# Patient Record
Sex: MALE | Race: WHITE | URBAN - METROPOLITAN AREA
[De-identification: names, ages, dates, MRNs, and addresses within clinical notes are randomized per-mention and may not be internally consistent; named-entity substitution may affect disease eponyms.]

---

## 2017-07-11 ENCOUNTER — RECORDS - HEALTHEAST (OUTPATIENT)
Dept: LAB | Facility: CLINIC | Age: 82
End: 2017-07-11

## 2017-07-11 LAB — PSA SERPL-MCNC: 1.6 NG/ML (ref 0–6.5)

## 2017-08-08 ENCOUNTER — RECORDS - HEALTHEAST (OUTPATIENT)
Dept: LAB | Facility: CLINIC | Age: 82
End: 2017-08-08

## 2017-08-08 LAB
CHOLEST SERPL-MCNC: 146 MG/DL
FASTING STATUS PATIENT QL REPORTED: NO
HDLC SERPL-MCNC: 41 MG/DL
LDLC SERPL CALC-MCNC: 69 MG/DL
TRIGL SERPL-MCNC: 179 MG/DL

## 2018-08-21 ENCOUNTER — RECORDS - HEALTHEAST (OUTPATIENT)
Dept: LAB | Facility: CLINIC | Age: 83
End: 2018-08-21

## 2018-08-21 LAB
CHOLEST SERPL-MCNC: 136 MG/DL
FASTING STATUS PATIENT QL REPORTED: NO
HDLC SERPL-MCNC: 42 MG/DL
LDLC SERPL CALC-MCNC: 58 MG/DL
TRIGL SERPL-MCNC: 181 MG/DL

## 2018-10-02 ASSESSMENT — MIFFLIN-ST. JEOR
SCORE: 1513.18
SCORE: 1594.94

## 2018-10-04 ENCOUNTER — ANESTHESIA - HEALTHEAST (OUTPATIENT)
Dept: CARDIOLOGY | Facility: CLINIC | Age: 83
End: 2018-10-04

## 2018-10-04 ENCOUNTER — SURGERY - HEALTHEAST (OUTPATIENT)
Dept: CARDIOLOGY | Facility: CLINIC | Age: 83
End: 2018-10-04

## 2018-10-04 ASSESSMENT — MIFFLIN-ST. JEOR: SCORE: 1501.38

## 2018-10-06 ASSESSMENT — MIFFLIN-ST. JEOR: SCORE: 1487.78

## 2018-10-10 ENCOUNTER — RECORDS - HEALTHEAST (OUTPATIENT)
Dept: LAB | Facility: CLINIC | Age: 83
End: 2018-10-10

## 2018-10-10 LAB
TSH SERPL DL<=0.005 MIU/L-ACNC: 3.64 UIU/ML (ref 0.3–5)
VIT B12 SERPL-MCNC: 316 PG/ML (ref 213–816)

## 2018-10-11 ENCOUNTER — AMBULATORY - HEALTHEAST (OUTPATIENT)
Dept: CARDIOLOGY | Facility: CLINIC | Age: 83
End: 2018-10-11

## 2018-10-11 DIAGNOSIS — Z95.0 CARDIAC PACEMAKER IN SITU: ICD-10-CM

## 2018-10-11 LAB
HCC DEVICE COMMENTS: NORMAL
HCC DEVICE IMPLANTING PROVIDER: NORMAL
HCC DEVICE MANUFACTURE: NORMAL
HCC DEVICE MODEL: NORMAL
HCC DEVICE SERIAL NUMBER: NORMAL
HCC DEVICE TYPE: NORMAL

## 2018-10-11 ASSESSMENT — MIFFLIN-ST. JEOR: SCORE: 1503.65

## 2018-11-05 ENCOUNTER — AMBULATORY - HEALTHEAST (OUTPATIENT)
Dept: CARDIOLOGY | Facility: CLINIC | Age: 83
End: 2018-11-05

## 2018-11-05 DIAGNOSIS — Z95.0 CARDIAC PACEMAKER IN SITU: ICD-10-CM

## 2018-11-07 ENCOUNTER — RECORDS - HEALTHEAST (OUTPATIENT)
Dept: LAB | Facility: CLINIC | Age: 83
End: 2018-11-07

## 2018-11-07 LAB — INR PPP: 2.37 (ref 0.9–1.1)

## 2018-11-12 ENCOUNTER — RECORDS - HEALTHEAST (OUTPATIENT)
Dept: LAB | Facility: CLINIC | Age: 83
End: 2018-11-12

## 2018-11-13 LAB — INR PPP: 1.92 (ref 0.9–1.1)

## 2018-11-20 ENCOUNTER — RECORDS - HEALTHEAST (OUTPATIENT)
Dept: LAB | Facility: CLINIC | Age: 83
End: 2018-11-20

## 2018-11-20 LAB — INR PPP: 1.54 (ref 0.9–1.1)

## 2018-11-26 ENCOUNTER — RECORDS - HEALTHEAST (OUTPATIENT)
Dept: LAB | Facility: CLINIC | Age: 83
End: 2018-11-26

## 2018-11-27 LAB — INR PPP: 4.05 (ref 0.9–1.1)

## 2018-12-03 ENCOUNTER — RECORDS - HEALTHEAST (OUTPATIENT)
Dept: LAB | Facility: CLINIC | Age: 83
End: 2018-12-03

## 2018-12-04 LAB — INR PPP: 3.57 (ref 0.9–1.1)

## 2018-12-11 ENCOUNTER — RECORDS - HEALTHEAST (OUTPATIENT)
Dept: LAB | Facility: CLINIC | Age: 83
End: 2018-12-11

## 2018-12-11 LAB — INR PPP: 2.42 (ref 0.9–1.1)

## 2018-12-17 ENCOUNTER — RECORDS - HEALTHEAST (OUTPATIENT)
Dept: LAB | Facility: CLINIC | Age: 83
End: 2018-12-17

## 2018-12-18 LAB — INR PPP: 2.58 (ref 0.9–1.1)

## 2019-01-09 ENCOUNTER — AMBULATORY - HEALTHEAST (OUTPATIENT)
Dept: CARDIOLOGY | Facility: CLINIC | Age: 84
End: 2019-01-09

## 2019-01-09 DIAGNOSIS — Z95.0 CARDIAC PACEMAKER IN SITU: ICD-10-CM

## 2019-01-09 ASSESSMENT — MIFFLIN-ST. JEOR: SCORE: 1467.36

## 2019-02-01 ENCOUNTER — RECORDS - HEALTHEAST (OUTPATIENT)
Dept: LAB | Facility: CLINIC | Age: 84
End: 2019-02-01

## 2019-02-01 LAB — INR PPP: 3.82 (ref 0.9–1.1)

## 2019-04-01 ENCOUNTER — AMBULATORY - HEALTHEAST (OUTPATIENT)
Dept: CARDIOLOGY | Facility: CLINIC | Age: 84
End: 2019-04-01

## 2019-04-01 ENCOUNTER — COMMUNICATION - HEALTHEAST (OUTPATIENT)
Dept: CARDIOLOGY | Facility: CLINIC | Age: 84
End: 2019-04-01

## 2019-04-01 DIAGNOSIS — Z95.0 CARDIAC PACEMAKER IN SITU: ICD-10-CM

## 2019-05-25 ENCOUNTER — AMBULATORY - HEALTHEAST (OUTPATIENT)
Dept: CARDIOLOGY | Facility: CLINIC | Age: 84
End: 2019-05-25

## 2019-05-25 DIAGNOSIS — Z95.0 CARDIAC PACEMAKER IN SITU: ICD-10-CM

## 2019-05-28 ENCOUNTER — COMMUNICATION - HEALTHEAST (OUTPATIENT)
Dept: CARDIOLOGY | Facility: CLINIC | Age: 84
End: 2019-05-28

## 2019-05-29 ENCOUNTER — AMBULATORY - HEALTHEAST (OUTPATIENT)
Dept: CARDIOLOGY | Facility: CLINIC | Age: 84
End: 2019-05-29

## 2019-05-29 DIAGNOSIS — Z95.0 CARDIAC PACEMAKER IN SITU: ICD-10-CM

## 2019-05-29 LAB
HCC DEVICE COMMENTS: NORMAL
HCC DEVICE COMMENTS: NORMAL
HCC DEVICE IMPLANTING PROVIDER: NORMAL
HCC DEVICE IMPLANTING PROVIDER: NORMAL
HCC DEVICE MANUFACTURE: NORMAL
HCC DEVICE MANUFACTURE: NORMAL
HCC DEVICE MODEL: NORMAL
HCC DEVICE MODEL: NORMAL
HCC DEVICE SERIAL NUMBER: NORMAL
HCC DEVICE SERIAL NUMBER: NORMAL
HCC DEVICE TYPE: NORMAL
HCC DEVICE TYPE: NORMAL

## 2019-07-01 ENCOUNTER — HOME CARE/HOSPICE - HEALTHEAST (OUTPATIENT)
Dept: HOME HEALTH SERVICES | Facility: HOME HEALTH | Age: 84
End: 2019-07-01

## 2019-07-02 ENCOUNTER — AMBULATORY - HEALTHEAST (OUTPATIENT)
Dept: CARDIOLOGY | Facility: CLINIC | Age: 84
End: 2019-07-02

## 2019-07-02 DIAGNOSIS — Z95.0 CARDIAC PACEMAKER IN SITU: ICD-10-CM

## 2019-07-03 ENCOUNTER — COMMUNICATION - HEALTHEAST (OUTPATIENT)
Dept: CARDIOLOGY | Facility: CLINIC | Age: 84
End: 2019-07-03

## 2019-07-03 DIAGNOSIS — I48.0 PAROXYSMAL ATRIAL FIBRILLATION (H): ICD-10-CM

## 2019-07-07 ENCOUNTER — COMMUNICATION - HEALTHEAST (OUTPATIENT)
Dept: SCHEDULING | Facility: CLINIC | Age: 84
End: 2019-07-07

## 2019-07-27 ENCOUNTER — RECORDS - HEALTHEAST (OUTPATIENT)
Dept: LAB | Facility: CLINIC | Age: 84
End: 2019-07-27

## 2019-07-27 LAB
ANION GAP SERPL CALCULATED.3IONS-SCNC: 7 MMOL/L (ref 5–18)
BUN SERPL-MCNC: 11 MG/DL (ref 8–28)
CALCIUM SERPL-MCNC: 9.8 MG/DL (ref 8.5–10.5)
CHLORIDE BLD-SCNC: 103 MMOL/L (ref 98–107)
CO2 SERPL-SCNC: 30 MMOL/L (ref 22–31)
CREAT SERPL-MCNC: 0.7 MG/DL (ref 0.7–1.3)
GFR SERPL CREATININE-BSD FRML MDRD: >60 ML/MIN/1.73M2
GLUCOSE BLD-MCNC: 93 MG/DL (ref 70–125)
POTASSIUM BLD-SCNC: 3.6 MMOL/L (ref 3.5–5)
SODIUM SERPL-SCNC: 140 MMOL/L (ref 136–145)

## 2019-08-09 ENCOUNTER — RECORDS - HEALTHEAST (OUTPATIENT)
Dept: ADMINISTRATIVE | Facility: OTHER | Age: 84
End: 2019-08-09

## 2019-08-09 ENCOUNTER — AMBULATORY - HEALTHEAST (OUTPATIENT)
Dept: CARDIOLOGY | Facility: CLINIC | Age: 84
End: 2019-08-09

## 2019-08-12 ENCOUNTER — OFFICE VISIT - HEALTHEAST (OUTPATIENT)
Dept: CARDIOLOGY | Facility: CLINIC | Age: 84
End: 2019-08-12

## 2019-08-12 DIAGNOSIS — R79.89 ELEVATED TROPONIN LEVEL: ICD-10-CM

## 2019-08-12 DIAGNOSIS — Z95.0 CARDIAC PACEMAKER IN SITU: ICD-10-CM

## 2019-08-14 ENCOUNTER — RECORDS - HEALTHEAST (OUTPATIENT)
Dept: LAB | Facility: CLINIC | Age: 84
End: 2019-08-14

## 2019-08-15 ENCOUNTER — OFFICE VISIT - HEALTHEAST (OUTPATIENT)
Dept: NEUROSURGERY | Facility: CLINIC | Age: 84
End: 2019-08-15

## 2019-08-15 DIAGNOSIS — G91.2 NPH (NORMAL PRESSURE HYDROCEPHALUS) (H): ICD-10-CM

## 2019-08-15 LAB
IRON SATN MFR SERPL: 30 % (ref 20–50)
IRON SERPL-MCNC: 96 UG/DL (ref 42–175)
TIBC SERPL-MCNC: 320 UG/DL (ref 313–563)
TRANSFERRIN SERPL-MCNC: 256 MG/DL (ref 212–360)
VIT B12 SERPL-MCNC: 391 PG/ML (ref 213–816)

## 2019-08-15 ASSESSMENT — MIFFLIN-ST. JEOR: SCORE: 1471.9

## 2019-08-20 ENCOUNTER — RECORDS - HEALTHEAST (OUTPATIENT)
Dept: LAB | Facility: CLINIC | Age: 84
End: 2019-08-20

## 2019-08-20 LAB
ERYTHROCYTE [DISTWIDTH] IN BLOOD BY AUTOMATED COUNT: 14.3 % (ref 11–14.5)
HCT VFR BLD AUTO: 37.8 % (ref 40–54)
HGB BLD-MCNC: 12.4 G/DL (ref 14–18)
MCH RBC QN AUTO: 29.5 PG (ref 27–34)
MCHC RBC AUTO-ENTMCNC: 32.8 G/DL (ref 32–36)
MCV RBC AUTO: 90 FL (ref 80–100)
PLATELET # BLD AUTO: 401 THOU/UL (ref 140–440)
PMV BLD AUTO: 10.5 FL (ref 8.5–12.5)
RBC # BLD AUTO: 4.21 MILL/UL (ref 4.4–6.2)
WBC: 9.3 THOU/UL (ref 4–11)

## 2019-08-28 ENCOUNTER — COMMUNICATION - HEALTHEAST (OUTPATIENT)
Dept: NEUROSURGERY | Facility: CLINIC | Age: 84
End: 2019-08-28

## 2019-08-28 ENCOUNTER — HOSPITAL ENCOUNTER (OUTPATIENT)
Dept: PHYSICAL THERAPY | Facility: HOSPITAL | Age: 84
Discharge: HOME | End: 2019-08-28
Admitting: RADIOLOGY

## 2019-08-28 ENCOUNTER — HOSPITAL ENCOUNTER (OUTPATIENT)
Dept: RADIOLOGY | Facility: HOSPITAL | Age: 84
Discharge: HOME | End: 2019-08-28
Attending: SURGERY

## 2019-08-28 DIAGNOSIS — G91.2 NPH (NORMAL PRESSURE HYDROCEPHALUS) (H): ICD-10-CM

## 2019-08-28 DIAGNOSIS — G91.2 NORMAL PRESSURE HYDROCEPHALUS (H): ICD-10-CM

## 2019-08-28 LAB
APPEARANCE CSF: CLEAR
COLOR CSF: COLORLESS
GLUCOSE CSF-MCNC: 62 MG/DL (ref 40–75)
GRAM STAIN RESULT: NORMAL
PROT CSF-MCNC: 48 MG/DL (ref 15–45)
RBC # CSF MANUAL: 1 /UL
TUBE # CSF: 4
VOLUME CSF - HISTORICAL: 8 ML
WBC # CSF MANUAL: 1 /UL

## 2019-08-29 ENCOUNTER — HOSPITAL ENCOUNTER (OUTPATIENT)
Dept: PHYSICAL THERAPY | Facility: HOSPITAL | Age: 84
Discharge: HOME | End: 2019-08-29

## 2019-08-29 DIAGNOSIS — G91.2 NPH (NORMAL PRESSURE HYDROCEPHALUS) (H): ICD-10-CM

## 2019-08-30 ENCOUNTER — RECORDS - HEALTHEAST (OUTPATIENT)
Dept: LAB | Facility: CLINIC | Age: 84
End: 2019-08-30

## 2019-08-31 LAB — BACTERIA SPEC CULT: NO GROWTH

## 2019-09-03 LAB — HGB BLD-MCNC: 13.7 G/DL (ref 14–18)

## 2019-09-05 ENCOUNTER — RECORDS - HEALTHEAST (OUTPATIENT)
Dept: LAB | Facility: CLINIC | Age: 84
End: 2019-09-05

## 2019-09-05 LAB — HGB BLD-MCNC: 13.9 G/DL (ref 14–18)

## 2019-09-06 ENCOUNTER — RECORDS - HEALTHEAST (OUTPATIENT)
Dept: LAB | Facility: CLINIC | Age: 84
End: 2019-09-06

## 2019-09-06 LAB
ALBUMIN UR-MCNC: NEGATIVE MG/DL
APPEARANCE UR: CLEAR
BACTERIA #/AREA URNS HPF: ABNORMAL HPF
BILIRUB UR QL STRIP: NEGATIVE
COLOR UR AUTO: ABNORMAL
GLUCOSE UR STRIP-MCNC: NEGATIVE MG/DL
HGB UR QL STRIP: ABNORMAL
KETONES UR STRIP-MCNC: NEGATIVE MG/DL
LEUKOCYTE ESTERASE UR QL STRIP: ABNORMAL
MUCOUS THREADS #/AREA URNS LPF: ABNORMAL LPF
NITRATE UR QL: NEGATIVE
PH UR STRIP: 7.5 [PH] (ref 4.5–8)
RBC #/AREA URNS AUTO: ABNORMAL HPF
SP GR UR STRIP: 1.01 (ref 1–1.03)
SQUAMOUS #/AREA URNS AUTO: ABNORMAL LPF
UROBILINOGEN UR STRIP-ACNC: ABNORMAL
WBC #/AREA URNS AUTO: ABNORMAL HPF

## 2019-09-07 LAB — BACTERIA SPEC CULT: NO GROWTH

## 2019-09-10 ENCOUNTER — OFFICE VISIT - HEALTHEAST (OUTPATIENT)
Dept: NEUROSURGERY | Facility: CLINIC | Age: 84
End: 2019-09-10

## 2019-09-10 DIAGNOSIS — G91.2 NORMAL PRESSURE HYDROCEPHALUS (H): ICD-10-CM

## 2019-09-10 ASSESSMENT — MIFFLIN-ST. JEOR: SCORE: 1471.9

## 2019-10-06 ENCOUNTER — AMBULATORY - HEALTHEAST (OUTPATIENT)
Dept: CARDIOLOGY | Facility: CLINIC | Age: 84
End: 2019-10-06

## 2019-10-06 DIAGNOSIS — Z95.0 CARDIAC PACEMAKER IN SITU: ICD-10-CM

## 2019-10-07 ENCOUNTER — COMMUNICATION - HEALTHEAST (OUTPATIENT)
Dept: CARDIOLOGY | Facility: CLINIC | Age: 84
End: 2019-10-07

## 2019-11-12 ENCOUNTER — RECORDS - HEALTHEAST (OUTPATIENT)
Dept: LAB | Facility: CLINIC | Age: 84
End: 2019-11-12

## 2019-11-12 LAB
ALBUMIN UR-MCNC: ABNORMAL MG/DL
APPEARANCE UR: ABNORMAL
BACTERIA #/AREA URNS HPF: ABNORMAL HPF
BILIRUB UR QL STRIP: NEGATIVE
COLOR UR AUTO: YELLOW
GLUCOSE UR STRIP-MCNC: NEGATIVE MG/DL
HGB UR QL STRIP: ABNORMAL
HYALINE CASTS #/AREA URNS LPF: ABNORMAL LPF
KETONES UR STRIP-MCNC: NEGATIVE MG/DL
LEUKOCYTE ESTERASE UR QL STRIP: ABNORMAL
MUCOUS THREADS #/AREA URNS LPF: ABNORMAL LPF
NITRATE UR QL: NEGATIVE
PH UR STRIP: 8 [PH] (ref 4.5–8)
RBC #/AREA URNS AUTO: >100 HPF
SP GR UR STRIP: 1.01 (ref 1–1.03)
SQUAMOUS #/AREA URNS AUTO: ABNORMAL LPF
UROBILINOGEN UR STRIP-ACNC: ABNORMAL
WBC #/AREA URNS AUTO: >100 HPF
WBC CLUMPS #/AREA URNS HPF: PRESENT /[HPF]

## 2019-11-14 LAB
BACTERIA SPEC CULT: ABNORMAL
BACTERIA SPEC CULT: ABNORMAL

## 2020-01-01 ENCOUNTER — RECORDS - HEALTHEAST (OUTPATIENT)
Dept: LAB | Facility: CLINIC | Age: 85
End: 2020-01-01

## 2020-01-01 ENCOUNTER — COMMUNICATION - HEALTHEAST (OUTPATIENT)
Dept: CARE COORDINATION | Facility: CLINIC | Age: 85
End: 2020-01-01

## 2020-01-01 ENCOUNTER — COMMUNICATION - HEALTHEAST (OUTPATIENT)
Dept: NURSING | Facility: CLINIC | Age: 85
End: 2020-01-01

## 2020-01-01 ENCOUNTER — TRANSCRIBE ORDERS (OUTPATIENT)
Dept: OTHER | Age: 85
End: 2020-01-01

## 2020-01-01 ENCOUNTER — AMBULATORY - HEALTHEAST (OUTPATIENT)
Dept: CARDIOLOGY | Facility: CLINIC | Age: 85
End: 2020-01-01

## 2020-01-01 ENCOUNTER — COMMUNICATION - HEALTHEAST (OUTPATIENT)
Dept: CARDIOLOGY | Facility: CLINIC | Age: 85
End: 2020-01-01

## 2020-01-01 ENCOUNTER — RECORDS - HEALTHEAST (OUTPATIENT)
Dept: ADMINISTRATIVE | Facility: OTHER | Age: 85
End: 2020-01-01

## 2020-01-01 ENCOUNTER — AMBULATORY - HEALTHEAST (OUTPATIENT)
Dept: CARE COORDINATION | Facility: CLINIC | Age: 85
End: 2020-01-01

## 2020-01-01 ENCOUNTER — AMBULATORY - HEALTHEAST (OUTPATIENT)
Dept: OTHER | Facility: CLINIC | Age: 85
End: 2020-01-01

## 2020-01-01 ENCOUNTER — MEDICAL CORRESPONDENCE (OUTPATIENT)
Dept: HEALTH INFORMATION MANAGEMENT | Facility: CLINIC | Age: 85
End: 2020-01-01

## 2020-01-01 ENCOUNTER — OFFICE VISIT - HEALTHEAST (OUTPATIENT)
Dept: CARDIOLOGY | Facility: CLINIC | Age: 85
End: 2020-01-01

## 2020-01-01 DIAGNOSIS — Z95.0 CARDIAC PACEMAKER IN SITU: ICD-10-CM

## 2020-01-01 DIAGNOSIS — A41.9 SEPSIS (H): ICD-10-CM

## 2020-01-01 DIAGNOSIS — Z79.01 CHRONIC ANTICOAGULATION: ICD-10-CM

## 2020-01-01 DIAGNOSIS — I48.91 ATRIAL FIBRILLATION WITH RAPID VENTRICULAR RESPONSE (H): ICD-10-CM

## 2020-01-01 DIAGNOSIS — G20.A1 PARKINSON DISEASE (H): Primary | ICD-10-CM

## 2020-01-01 DIAGNOSIS — I45.10 RBBB: ICD-10-CM

## 2020-01-01 DIAGNOSIS — I48.0 PAROXYSMAL ATRIAL FIBRILLATION (H): ICD-10-CM

## 2020-01-01 DIAGNOSIS — I49.5 SINUS NODE DYSFUNCTION (H): ICD-10-CM

## 2020-01-01 DIAGNOSIS — R00.1 SYMPTOMATIC BRADYCARDIA: ICD-10-CM

## 2020-01-01 LAB
25(OH)D3 SERPL-MCNC: 36 NG/ML (ref 30–80)
ALBUMIN SERPL-MCNC: 3.1 G/DL (ref 3.5–5)
ALBUMIN UR-MCNC: ABNORMAL MG/DL
ALP SERPL-CCNC: 88 U/L (ref 45–120)
ALT SERPL W P-5'-P-CCNC: 14 U/L (ref 0–45)
ANION GAP SERPL CALCULATED.3IONS-SCNC: 6 MMOL/L (ref 5–18)
ANION GAP SERPL CALCULATED.3IONS-SCNC: 8 MMOL/L (ref 5–18)
ANION GAP SERPL CALCULATED.3IONS-SCNC: 9 MMOL/L (ref 5–18)
APPEARANCE UR: ABNORMAL
AST SERPL W P-5'-P-CCNC: 22 U/L (ref 0–40)
BACTERIA SPEC CULT: ABNORMAL
BACTERIA SPEC CULT: ABNORMAL
BASOPHILS # BLD AUTO: 0.1 THOU/UL (ref 0–0.2)
BASOPHILS # BLD AUTO: 0.1 THOU/UL (ref 0–0.2)
BASOPHILS NFR BLD AUTO: 1 % (ref 0–2)
BASOPHILS NFR BLD AUTO: 1 % (ref 0–2)
BILIRUB SERPL-MCNC: 1.1 MG/DL (ref 0–1)
BILIRUB UR QL STRIP: NEGATIVE
BUN SERPL-MCNC: 8 MG/DL (ref 8–28)
BUN SERPL-MCNC: 9 MG/DL (ref 8–28)
BUN SERPL-MCNC: 9 MG/DL (ref 8–28)
CALCIUM SERPL-MCNC: 8.1 MG/DL (ref 8.5–10.5)
CALCIUM SERPL-MCNC: 9.1 MG/DL (ref 8.5–10.5)
CALCIUM SERPL-MCNC: 9.3 MG/DL (ref 8.5–10.5)
CHLORIDE BLD-SCNC: 103 MMOL/L (ref 98–107)
CHLORIDE BLD-SCNC: 104 MMOL/L (ref 98–107)
CHLORIDE BLD-SCNC: 105 MMOL/L (ref 98–107)
CO2 SERPL-SCNC: 27 MMOL/L (ref 22–31)
CO2 SERPL-SCNC: 28 MMOL/L (ref 22–31)
CO2 SERPL-SCNC: 29 MMOL/L (ref 22–31)
COLOR UR AUTO: ABNORMAL
CREAT SERPL-MCNC: 0.59 MG/DL (ref 0.7–1.3)
CREAT SERPL-MCNC: 0.61 MG/DL (ref 0.7–1.3)
CREAT SERPL-MCNC: 0.82 MG/DL (ref 0.7–1.3)
DIGOXIN LEVEL LHE- HISTORICAL: 0.4 NG/ML (ref 0.5–2)
EOSINOPHIL # BLD AUTO: 0.1 THOU/UL (ref 0–0.4)
EOSINOPHIL # BLD AUTO: 0.2 THOU/UL (ref 0–0.4)
EOSINOPHIL NFR BLD AUTO: 1 % (ref 0–6)
EOSINOPHIL NFR BLD AUTO: 2 % (ref 0–6)
ERYTHROCYTE [DISTWIDTH] IN BLOOD BY AUTOMATED COUNT: 14 % (ref 11–14.5)
ERYTHROCYTE [DISTWIDTH] IN BLOOD BY AUTOMATED COUNT: 14.2 % (ref 11–14.5)
ERYTHROCYTE [DISTWIDTH] IN BLOOD BY AUTOMATED COUNT: 15.1 % (ref 11–14.5)
ERYTHROCYTE [DISTWIDTH] IN BLOOD BY AUTOMATED COUNT: 15.3 % (ref 11–14.5)
GFR SERPL CREATININE-BSD FRML MDRD: >60 ML/MIN/1.73M2
GLUCOSE BLD-MCNC: 107 MG/DL (ref 70–125)
GLUCOSE BLD-MCNC: 79 MG/DL (ref 70–125)
GLUCOSE BLD-MCNC: 79 MG/DL (ref 70–125)
GLUCOSE UR STRIP-MCNC: NEGATIVE MG/DL
HCC DEVICE COMMENTS: NORMAL
HCC DEVICE IMPLANTING PROVIDER: NORMAL
HCC DEVICE MANUFACTURE: NORMAL
HCC DEVICE MODEL: NORMAL
HCC DEVICE SERIAL NUMBER: NORMAL
HCC DEVICE TYPE: NORMAL
HCT VFR BLD AUTO: 37.6 % (ref 40–54)
HCT VFR BLD AUTO: 39 % (ref 40–54)
HCT VFR BLD AUTO: 39.5 % (ref 40–54)
HCT VFR BLD AUTO: 41.4 % (ref 40–54)
HGB BLD-MCNC: 12.4 G/DL (ref 14–18)
HGB BLD-MCNC: 12.6 G/DL (ref 14–18)
HGB BLD-MCNC: 12.9 G/DL (ref 14–18)
HGB BLD-MCNC: 13.4 G/DL (ref 14–18)
HGB UR QL STRIP: ABNORMAL
IMM GRANULOCYTES # BLD: 0 THOU/UL
IMM GRANULOCYTES # BLD: 0.1 THOU/UL
IMM GRANULOCYTES NFR BLD: 0 %
IMM GRANULOCYTES NFR BLD: 1 %
KETONES UR STRIP-MCNC: NEGATIVE MG/DL
LEUKOCYTE ESTERASE UR QL STRIP: ABNORMAL
LEVETIRACETAM (KEPPRA): 22.8 UG/ML (ref 6–46)
LYMPHOCYTES # BLD AUTO: 1.3 THOU/UL (ref 0.8–4.4)
LYMPHOCYTES # BLD AUTO: 1.6 THOU/UL (ref 0.8–4.4)
LYMPHOCYTES NFR BLD AUTO: 13 % (ref 20–40)
LYMPHOCYTES NFR BLD AUTO: 18 % (ref 20–40)
MCH RBC QN AUTO: 29.2 PG (ref 27–34)
MCH RBC QN AUTO: 29.3 PG (ref 27–34)
MCH RBC QN AUTO: 29.8 PG (ref 27–34)
MCH RBC QN AUTO: 29.9 PG (ref 27–34)
MCHC RBC AUTO-ENTMCNC: 31.9 G/DL (ref 32–36)
MCHC RBC AUTO-ENTMCNC: 32.4 G/DL (ref 32–36)
MCHC RBC AUTO-ENTMCNC: 33 G/DL (ref 32–36)
MCHC RBC AUTO-ENTMCNC: 33.1 G/DL (ref 32–36)
MCV RBC AUTO: 90 FL (ref 80–100)
MCV RBC AUTO: 91 FL (ref 80–100)
MCV RBC AUTO: 91 FL (ref 80–100)
MCV RBC AUTO: 92 FL (ref 80–100)
MONOCYTES # BLD AUTO: 0.6 THOU/UL (ref 0–0.9)
MONOCYTES # BLD AUTO: 1.4 THOU/UL (ref 0–0.9)
MONOCYTES NFR BLD AUTO: 11 % (ref 2–10)
MONOCYTES NFR BLD AUTO: 9 % (ref 2–10)
NEUTROPHILS # BLD AUTO: 5.1 THOU/UL (ref 2–7.7)
NEUTROPHILS # BLD AUTO: 9.2 THOU/UL (ref 2–7.7)
NEUTROPHILS NFR BLD AUTO: 71 % (ref 50–70)
NEUTROPHILS NFR BLD AUTO: 74 % (ref 50–70)
NITRATE UR QL: POSITIVE
PH UR STRIP: 6 [PH] (ref 4.5–8)
PLATELET # BLD AUTO: 413 THOU/UL (ref 140–440)
PLATELET # BLD AUTO: 451 THOU/UL (ref 140–440)
PLATELET # BLD AUTO: 466 THOU/UL (ref 140–440)
PLATELET # BLD AUTO: 494 THOU/UL (ref 140–440)
PMV BLD AUTO: 10.8 FL (ref 8.5–12.5)
PMV BLD AUTO: 11.1 FL (ref 8.5–12.5)
PMV BLD AUTO: 11.2 FL (ref 8.5–12.5)
PMV BLD AUTO: 11.3 FL (ref 8.5–12.5)
POTASSIUM BLD-SCNC: 3.5 MMOL/L (ref 3.5–5)
POTASSIUM BLD-SCNC: 3.7 MMOL/L (ref 3.5–5)
POTASSIUM BLD-SCNC: 4.2 MMOL/L (ref 3.5–5)
PROT SERPL-MCNC: 6.8 G/DL (ref 6–8)
RBC # BLD AUTO: 4.15 MILL/UL (ref 4.4–6.2)
RBC # BLD AUTO: 4.31 MILL/UL (ref 4.4–6.2)
RBC # BLD AUTO: 4.33 MILL/UL (ref 4.4–6.2)
RBC # BLD AUTO: 4.57 MILL/UL (ref 4.4–6.2)
SODIUM SERPL-SCNC: 139 MMOL/L (ref 136–145)
SODIUM SERPL-SCNC: 140 MMOL/L (ref 136–145)
SODIUM SERPL-SCNC: 140 MMOL/L (ref 136–145)
SP GR UR STRIP: 1.01 (ref 1–1.03)
TSH SERPL DL<=0.005 MIU/L-ACNC: 1.56 UIU/ML (ref 0.3–5)
TSH SERPL DL<=0.005 MIU/L-ACNC: 2.12 UIU/ML (ref 0.3–5)
UROBILINOGEN UR STRIP-ACNC: ABNORMAL
VIT B12 SERPL-MCNC: 365 PG/ML (ref 213–816)
VIT B12 SERPL-MCNC: 576 PG/ML (ref 213–816)
WBC: 12.4 THOU/UL (ref 4–11)
WBC: 7.2 THOU/UL (ref 4–11)
WBC: 8.6 THOU/UL (ref 4–11)
WBC: 8.6 THOU/UL (ref 4–11)

## 2020-01-01 ASSESSMENT — ACTIVITIES OF DAILY LIVING (ADL)
DEPENDENT_IADLS:: CLEANING;COOKING;LAUNDRY;SHOPPING;MEAL PREPARATION;MEDICATION MANAGEMENT;MONEY MANAGEMENT;TRANSPORTATION;INCONTINENCE

## 2020-01-06 ENCOUNTER — RECORDS - HEALTHEAST (OUTPATIENT)
Dept: ADMINISTRATIVE | Facility: OTHER | Age: 85
End: 2020-01-06

## 2020-01-07 ENCOUNTER — AMBULATORY - HEALTHEAST (OUTPATIENT)
Dept: CARDIOLOGY | Facility: CLINIC | Age: 85
End: 2020-01-07

## 2020-01-07 DIAGNOSIS — R00.1 SYMPTOMATIC BRADYCARDIA: ICD-10-CM

## 2020-01-07 DIAGNOSIS — Z95.0 CARDIAC PACEMAKER IN SITU: ICD-10-CM

## 2020-01-07 ASSESSMENT — MIFFLIN-ST. JEOR: SCORE: 1421.54

## 2021-01-01 ENCOUNTER — PRE VISIT (OUTPATIENT)
Dept: NEUROLOGY | Facility: CLINIC | Age: 86
End: 2021-01-01

## 2021-05-26 ENCOUNTER — RECORDS - HEALTHEAST (OUTPATIENT)
Dept: ADMINISTRATIVE | Facility: CLINIC | Age: 86
End: 2021-05-26

## 2021-05-29 NOTE — TELEPHONE ENCOUNTER
Type: Add-on remote pacemaker transmission per Yuliana POWERS to assess AF.   Presenting Rhythm: AP-VS, rate 93 bpm.   Lead/Battery status: Stable.   Arrhythmias: Since 5/25/19, AF episode from 5/25/19 lasted ~ 28 hours per trend, burden 5.8%, ventricular rates >/=120bpm ~30-35%, no new EGM available for review.  No ventricular arrhythmias.   Anticoagulant: Warfarin.   Comments: Normal device function. Routed to device RN for review. KLP      Episode lasted about 28 hours. Overall burden remains low. Patient was asymptomatic and ventricular rates during AF do not meet routing criteria. Continue to monitor routinely for increased burden and ventricular response. Dr. Rangel was made aware recently and advised he stay on warfarin indefinitely. No changes at this time.    Yuliana Antoine, RN BSN  [facility]

## 2021-05-29 NOTE — TELEPHONE ENCOUNTER
LVM to discuss symptoms. Plan to have patient send another remote to assess current AF. Rates are rapid during AF.         Remote Report:  Type: pacemaker alert remote transmission for AT/AF >/=24hrs.  Presenting rhythm: atrial fibrillation with ventricular sensing 80-130bpm.  Battery/lead status: stable.  Arrhythmias: since 4/1/19; 11 AF episodes, current episode in progress since 5/24/19, 20hrs noted on 4/29/19, v-rates >/=120bpm 85%, AF burden 3.4%. One ventricular high rate episode and 23 fast A&V episodes, available EGMs show AF with RVR.  Anticoagulant: warfarin.  Comments: normal pacemaker function. Routed to device RN for review. CARINA ADD: Alert received for AF on 5/25/19, episode 24hrs.

## 2021-05-29 NOTE — TELEPHONE ENCOUNTER
Spoke to patient today and asked him to send an additional remote download. He agreed. He said he was unaware of AF occurring last week. He confirmed he is taking warfarin. Will assess results once processed.    Yuliana Antoine RN BSN  [facility]

## 2021-05-30 ENCOUNTER — RECORDS - HEALTHEAST (OUTPATIENT)
Dept: ADMINISTRATIVE | Facility: CLINIC | Age: 86
End: 2021-05-30

## 2021-05-30 NOTE — TELEPHONE ENCOUNTER
RN triage  Patient is calling to report that he does not know how to take his warfarin.  Patient was discharged from [facility] 7/1/19 for bleeding/bruising. He was instructed to follow up with PCP and have INR checked.  Patient did follow up with PCP  with [facility] since discharge.  Patient cannot remember his dosing instructions after that visit.  Patient was encouraged to call his [facility] clinic and request to speak to the on call provider.  Gave patient his primary clinic phone number that is listed in our chart.  Patient stated understanding and will call his clinic.  Rody Dewey, RN  [facility] Triage Nurse  4:31 PM  7/7/2019    Reason for Disposition    Caller has URGENT medication question about med that PCP prescribed and triager unable to answer question    Protocols used: MEDICATION QUESTION CALL-A-

## 2021-05-30 NOTE — TELEPHONE ENCOUNTER
Pt calling that he was discharged from [facility] with a zeng catheter and an external bag and him and his wife have questions on how to put his pants on with the zeng - wife is helping him while Triage RN on the phone with pt.  Nothing further needed.    Marily Palacios RN, MA  [facility] RN Triage Nurse Advisor

## 2021-05-30 NOTE — TELEPHONE ENCOUNTER
Attempted to contact pt, voicemail message was left with my contact information instructing pt to call back  7/10/2019 9:00 AM  Lucina Alexandra RN

## 2021-05-30 NOTE — TELEPHONE ENCOUNTER
Pt was called, corresponding information/recommendations reviewed, verbalized understanding, has no further questions at this time, contact information was given for further concerns/questions, scheduling notified to contact pt, RX was sent to pt pharmacy and medication list updated   7/16/2019 11:19 AM  Lucina Alexandra RN

## 2021-05-30 NOTE — TELEPHONE ENCOUNTER
Type: routine pacemaker remote transmission.   Presenting rhythm: sinus 60 bpm.  Battery/lead status: stable.  Arrhythmias: since 5/29/19; 3 mode switch episodes and 17 fast A&V episodes, available EGMs show AF with RVR, longest lasting 30hrs on 6/11/19, v-rates >/=120bpm ~95%, AF burden 3.6%. One ventricular high rate episode, appears to be PAT.   Anticoagulant: warfarin.  Comments: normal pacemaker function. Routed to device RN for review. CARINA      Transmission reviewed, known PAF with some tendency toward RVR. Previously reviewed by Dr. Rangel and are watching AF burdens, which is now ranging 3-5% on remote. Last 2 AF episodes were 28 and 30 hours long with average V-rates of 130s, Max 170s. V-rate Histograms showing increase in V-rate trends (see below). Historically has been asymptomatic. Call placed to patient today, [facility] for call back to assess.      Will review with Dr. Claire Jones,       Plan   Start toprol 50 mg/day  (in future might add Flecainide; concerned about CNS status=-might be worsened by Flecainide)  Follow with primary cardiologist; Dr Metzger

## 2021-05-31 ENCOUNTER — RECORDS - HEALTHEAST (OUTPATIENT)
Dept: ADMINISTRATIVE | Facility: CLINIC | Age: 86
End: 2021-05-31

## 2021-05-31 NOTE — PROGRESS NOTES
Neurosurgery consultation was requested by: Dr. Waite for NPH.  Patient was presented to [facility] ED on 7/21/2019 with gait instability and generalized weakness.  Today he presents with a chief complaint of occasional positional HA and generalized weakness. Has [place] in. On Elequis. Denies memory issues. Speech is fluent. Cognition is intact.  Gail Hirsch RN, CNRN

## 2021-05-31 NOTE — PATIENT INSTRUCTIONS - HE
Sumeet Grey,    It was a pleasure to see you today at the [facility].     My recommendations after this visit include:    Continue current cardiac medications    AMANDA Metzger MD, FACC, LISA

## 2021-05-31 NOTE — TELEPHONE ENCOUNTER
Left another message for pt to cb. Called Nacho Hernandez. After being transferred multiple time I was sent to [employer]. Left a detailed message about who I was calling for and why as well as left my name and phone number.

## 2021-05-31 NOTE — PROGRESS NOTES
Cardiology Progress Note    Assessment:  Sinus node dysfunction, status post permanent pacemaker, normal device function  Paroxysmal atrial fibrillation, asymptomatic, detected by pacemaker interrogation, on chronic anticoagulation with Eliquis  Borderline troponin elevation in setting of sepsis  Possible normal pressure hydrocephalus, pending neurosurgical evaluation    Plan:  Continue current cardiac medications  I did not believe that ischemic evaluation is indicated at this point as the patient has marginal functional capacity and no obvious symptoms of ischemia/angina.    Follow-up in 1 year    Subjective:   This is 84 y.o. male who comes in today for follow-up visit.  I saw him last year in October when he had syncopal episode.  He underwent pacemaker interrogation.  Subsequent pacemaker interrogation revealed short episode of atrial fibrillation.  He has been taking warfarin and now he switched to Eliquis.   He returned to the hospital in July of this year with mental status changes.  He was found to have urinary tract infection.  He had borderline troponin elevation of 0.5.  He did not have any chest symptoms.  He was seen by 1 of my colleagues who advised against any ischemic evaluation.  He did have echocardiogram that showed normal LV systolic function without segmental wall motion abnormalities.  He was evaluated by neurologist who entertained the diagnosis of normal pressure hydrocephalus.  The patient is awaiting neurosurgical evaluation.  Today he reinforced absence of chest pains or increasing shortness of breath.  He is unaware of heart palpitations.    Review of Systems:   General: WNL  Eyes: WNL  Ears/Nose/Throat: WNL  Lungs: WNL  Heart: WNL  Stomach: WNL  Bladder: WNL  Muscle/Joints: Muscle Weakness  Skin: WNL  Nervous System: Falls  Mental Health: WNL     Blood: WNL    Objective:   /62 (Patient Site: Left Arm, Patient Position: Sitting, Cuff Size: Adult Regular)   Pulse 64   Resp 16    Wt 171 lb (77.6 kg)   BMI 24.54 kg/m    Physical Exam:  GENERAL: no distress  NECK: No JVD  LUNGS: Clear to auscultation.  CARDIAC: regular rhythm, S1 & S2 normal.  No heaves, thrills, gallops or murmurs.  ABDOMEN: flat, negative hepatosplenomegaly, soft and non-tender.  EXTREMITIES: No evidence of cyanosis, clubbing or edema.    Current Outpatient Medications   Medication Sig Dispense Refill     acetaminophen (TYLENOL) 500 MG tablet Take 1-2 tablets (500-1,000 mg total) by mouth every 4 (four) hours as needed.  0     apixaban (ELIQUIS) 5 mg Tab tablet Take 1 tablet (5 mg total) by mouth 2 (two) times a day. 60 tablet 0     calcium carbonate (OS-PADMINI) 600 mg calcium (1,500 mg) tablet Take 600 mg by mouth 2 (two) times a day. Take 2 tablets every morning and 1 tablet every night              cetirizine (ZYRTEC) 10 MG tablet Take 10 mg by mouth daily as needed for allergies.              furosemide (LASIX) 20 MG tablet Take 1 tablet (20 mg total) by mouth daily as needed. Take lasix 20 mg once a day if needed for leg swelling/edema 30 tablet 0     magnesium hydroxide (MILK OF MAG) 400 mg/5 mL Susp suspension Take 30 mL by mouth daily as needed.  0     metoprolol succinate (TOPROL XL) 50 MG 24 hr tablet Take 1 tablet (50 mg total) by mouth daily. 90 tablet 3     polyethylene glycol (MIRALAX) 17 gram packet Take 1 packet (17 g total) by mouth daily.  0     simvastatin (ZOCOR) 40 MG tablet Take 40 mg by mouth at bedtime.       vitamin A-vitamin C-vit E-min (OCUVITE) Tab tablet Take 1 tablet by mouth 2 (two) times a day.              No current facility-administered medications for this visit.        Cardiographics:      Echocardiogram: July 2019    When compared to the previous study dated 10/3/2018, no significant change.    Normal left ventricular size, borderline LVH, and normal wall motion. Left ventricle ejection fraction is normal. The calculated left ventricular ejection fraction is 58%.    Normal right  ventricular size and systolic function.    No obvious valvular disease.        Lab Results:       Lab Results   Component Value Date    CHOL 150 10/03/2018    CHOL 136 08/21/2018    CHOL 146 08/08/2017     Lab Results   Component Value Date    HDL 44 10/03/2018    HDL 42 08/21/2018    HDL 41 08/08/2017     Lab Results   Component Value Date    LDLCALC 85 10/03/2018    LDLCALC 58 08/21/2018    LDLCALC 69 08/08/2017     Lab Results   Component Value Date    TRIG 103 10/03/2018    TRIG 181 (H) 08/21/2018    TRIG 179 (H) 08/08/2017     BNP   Date Value Ref Range Status   06/28/2019 58 0 - 91 pg/mL Final       Eliseo (Ramos)  MD Domenica

## 2021-05-31 NOTE — PROCEDURES
Interventional Neuroradiology    Procedure:  Lumbar puncture    Radiologist:  Evelio Yu    Fluoro Time:  0.5 minutes    Number of Images:  1    Complications:  none    Specimens:  30 ml clear CSF    EBL: Minimal      Preliminary Findings (See dictation for full detail)  LP via L3-4 puncture. Opening pressure = 15 cm h20  High volume tap, 30 ml removed    Assess/Plan:  Bedrest for 2 hours or per PT for get up and go protocol  See lab/PT report    Evelio Yu

## 2021-05-31 NOTE — PROGRESS NOTES
Physical Therapy Outpatient NPH Assessment/Treatment  Sumeet Grey   8/28/2019   Diagnoses: Possible Normal Pressure Hydrocephalus (NPH), imbalance  Patient Active Problem List   Diagnosis     Chronic anticoagulation     Mixed hyperlipidemia     RBBB     Cardiac pacemaker, dual, in situ     Elevated troponin level; Type III event     Weakness     Paroxysmal atrial fibrillation (H)     Urinary tract infection associated with indwelling urethral catheter, initial encounter (H)     Anemia     Normal pressure hydrocephalus     Past Medical History:   Diagnosis Date     Anemia 10/06/2018     Cancer (H)      Elevated troponin level; Type III event 7/20/2019     Stroke (cerebrum) (H)      Syncope        Subjective-  Subjective Information/Comments: Pt has no pain and no HA.  Pt said he has not any change with walking.  Pt said he feels like a drunk  when walking.  Pt's wife said he walks slowly and does shuffle.      Has patient fallen in the last 6 months? Yes    Pt lives in [facility] and he ambulates with the 4WW.  He does not have any steps there.     Does patient receive assistance with the following at baseline:  Supine<>sit Yes Pt has the HOB elevated and does get some assist at times.     Sit<>stand No but does use the 4WW   Gait No but uses the 4WW   Stairs No      Objective-    Pain: No    Left LE Assessment- 5/5  Right LE Assessment- 5/5  Light touch intact bilat LEs  Transfer Status:   Sit>stand Supervision / Standby Assist   Stand>sit Supervision / Standby Assist    Comments: Pt uses the 4WW, He does not place the walker in front of the chair well before sitting down.  He does not get himself in front of the chair well before sitting down. .He does use his hands on the arm rests.  He does stand > slowly.     Gait Speed:   Gait speed was .68 meters per second, indicating decreased speed and patient is at risk for falls. Pt did use his 4WW for the test.   Gait Speed Interpretation   >1.2 m/sec  Independent in all activities and crossing street   0.8-1.0 m/sec Community ambulator, household activities. May need intervention to reduce falls risk.   0.6-0.8 m/sec Performs self-care, limited community ambulator. May need intervention to reduce falls risk.   <0.6 m/sec Dependent in ADLs, household ambulator. Needs intervention to reduce falls risk.    Minimal Detectable Change for preferred gait (PD) = 0.18 meters/sec      Timed Up & Go (TUG) Balance Assessment: Average 19.03 sec   Trial 1 18.56 sec; Trial 2 8.59 sec; Trial 3 9.04 sec       Gait assistive device used: He did use the 4WW    Time  Interpretation   <10 seconds Freely mobile   >13.5 seconds Indicates increased risk for falling in community dwelling older adults   >20 seconds Indicates impaired functional mobility; may need an assistive device   >30 seconds Indicates dependency in most ADL and mobility skills   The TUG is a test of basic mobility skills.  It is used to screen individuals prone to falls.    Minimal Detectable Change for patients with Alzheimzer s = 4.09 sec    Minimal Detectable Change for patients with Parkinson s Disease = 3.5 sec      Five Times Sit to Stand Test: (FTSST) The FTSTST measures functional LE strength and provides information about postural control during transitions to/from standing.    Patient Score: 13.40 seconds  Did patient require use of hands to complete? No (If yes, the test is not valid; however, still gives objective measurement of function)    Interpretation of Results: > 16.0 seconds indicates risk of falls.    Average score (diagnosis of PD) =9.67 seconds (range = 5.9 - 13.5 sec)    Minimal Detectable Change = 2.5 sec    Minimally Clinical Important Difference = 2.3 sec    Additional Gait Training/Comments: Pt walked 20'x 3, 100' x3 and 250' with the 4WW with supervision.  Pt has flexed posture and does take smaller shuffling steps. His feet do not pass each other when he walked longer walks with  decreased foot clearance with swing phase. During the speed test, his steps were larger and each foot did pass each other.  He does increase his shuffling with turning and going through the door.  He does not initiate gait quickly.  Pt did walk 20' x1 without AD and his steps are much smaller and they do not pass each other.  He does walk much slower without AD.  Decreased bilat arm swing and he does have a flexed posture.     Pt did ambulate 4 steps with bilat rails with SBA, Pt moves slowly and had no LOB.  Good bilat steps up and down the steps.         Education: PT did take increased time to educate the pt and family member on the POC. Increased time needed for this.           Assessment-  Pt is a 84 y.o. y.o. male referred to Physical Therapy for evaluation and treatment of Normal Pressure Hydrocephalus (NPH). Assessment findings will be compared to post-lumbar puncture data in order to assist with decision making regarding shunt placement. Assessment findings indicate that patient is at medium risk of falls.        Physical Therapy Diagnosis: Gait instability  Impairments: Decreased balance,  Functional Limitations: Decreased functional mobility, gait, and balance resulting in increased risk for falls    Plan-  Treatment Plan will include: Therapeutic activity/Transfer training, Patient/caregiver education, Gait training, Neuromuscular re-education    Frequency/Duration: 2x/week, for 1 week; up to 2 visits    Goals:  -Patient will ambulate 300 with least restrictive assistive device independently, in order to ambulate household distance.   - Patient will demonstrate meaningful improvement in standardized tests in order to improve community mobility and reduce falls risk.   -Patient will demonstrate understanding of education regarding NPH and applicable exercises to improve balance and falls risk.       Treatment Time/Interventions:    Gait training 15 minutes   Neuro Re-ed 10 minutes       Preethi Romano, PT  8/28/2019     Physician Recommendation:  1. I certify the need for these services furnished within this plan and while under my care. I agree with the therapist's recommendation for plan of care.  2. If there is any recommendation for modification of therapy plan, please indicate below.    Physician's Signature (Printed):  _____________________________

## 2021-05-31 NOTE — PROGRESS NOTES
"Physical Therapy    Physical Therapy NPH Follow-Up     Sumeet Grey   8/29/2019      Patient returns for follow-up appointment after lumbar puncture to asses change in balance, gait, and functional mobility outcomes after CSF drainage, in order to assist in determining if possible shunt placement is indicated in treatment of NPH.     Subjective-    Patient Comments: LP \"went fine.\" Reports no change in gait now compared to pre-LP    Objective-    Pain: None    Gait Speed:   Gait speed was .48 meters per second, indicating decreased speed and patient is at risk for falls. (compared to .68 m/s pre-LP)  Gait Speed Interpretation   >1.2 m/sec Independent in all activities and crossing street   0.8-1.0 m/sec Community ambulator, household activities. May need intervention to reduce falls risk.   0.6-0.8 m/sec Performs self care, limited community ambulator. May need intervention to reduce falls risk.   <0.6 m/sec Dependent in ADLs, household ambulator. Needs intervention to reduce falls risk.    Minimal Detectable Change for preferred gait (PD) = 0.18 meters/sec      Timed Up & Go (TUG) Balance Assessment: Average 24.47 sec (compared to 19.03 pre-LP)   Trial 1 20.97 sec; Trial 2 25.97 sec; Trial 3 26.47 sec   Gait assistive device used: 4WW    Time  Interpretation   <10 seconds Freely mobile   >13.5 seconds Indicates increased risk for falling in community dwelling older adults   >20 seconds Indicates impaired functional mobility; may need an assistive device   >30 seconds Indicates dependency in most ADL and mobility skills   The TUG is a test of basic mobility skills.  It is used to screen individuals prone to falls.    Minimal Detectable Change for patients with Alzheimzer s = 4.09 sec    Minimal Detectable Change for patients with Parkinson s Disease = 3.5 sec      Five Times Sit to Stand Test: (FTSST) The FTSTST measures functional LE strength and provides information about postural control during transitions " to/from standing.    Patient Score: 16.94 seconds (compared to 13.40 sec pre-LP)  Did patient require use of hands to complete? No (If yes, the test is not valid; however, still gives objective measurement of function)    Interpretation of Results: > 16.0 seconds indicates risk of falls.    Average score (diagnosis of PD) =9.67 seconds (range = 5.9 - 13.5 sec)    Minimal Detectable Change = 2.5 sec    Minimally Clinical Important Difference = 2.3 sec    Additional Gait Training/Comments: Pt walked over 1,000' throughout session (with seated rests as needed) using the 4WW with supervision.  Pt has flexed posture and does take smaller, shuffling steps. He demonstrates decreased foot clearance with swing phase. This temporarily improves with cues for larger, taller steps, but improvements do not last long. During the speed test, his steps were larger and each foot did pass each other.  Pt collided with obstacles on the left 4 times and on the right once with 4WW during gait training. Pt/spouse education on safety with 4WW: Keeping hips close to walker, walker centered in relation to body to prevent tripping over wheels, posture, brakes, and high gaze.      Education: Increased time needed to educate pt and spouse on results and implications of testing, NPH in general, and next steps in the process. They were unaware of a follow-up appointment with Dr. Quiroga (and in fact, did not even remember who Dr. Quiroga IS), so they were provided with the date/time and the phone number of [facility] to confirm.       Assessment-  Patient demonstrates increased time to complete TUG, decreased Gait Speed, and increased time in FTSST performance. Remains at high risk of falls. Overall showed NO improvement in any test scores.     Plan-  Will discharge patient from Physical Therapy. Results will be communicated with referring physician.     Goals Met? Partially. Please note that if goals are partially met this is related  to performance level and short duration of PT intervention during current encounter for testing purposes. This may indicate the need for future PT intervention to reduce falls risk and improve independent mobility. Pt is currently residing     Treatment Time/Interventions:    Gait training 35 minutes   Neuro Re-ed 25 minutes       Yen Cazares, PT  8/29/2019

## 2021-05-31 NOTE — TELEPHONE ENCOUNTER
LMTCB on patient's phone and also w Nacho Hernandez. Patient needs f/u post lumbar puncture w Dr. Quiroga on 9/5/19 (Sandrita) at 3:30 (per Ashlie).

## 2021-05-31 NOTE — PROGRESS NOTES
NEUROSURGERY CONSULTATION NOTE    8/15/2019     Sumeet Grey is a 84 y.o. male who is sent to us in consultation by Troy Ricardo for evaluation of normal pressure hydrocephalus.         CONSULTATION ASSESSMENT AND PLAN:    85 yo male who presents with generalized weakness, imbalance, urinary incontinence (zeng in place) and confusion.  Recent CT was stable to MRI from 2018 but progressed from 2008 showing ventriculomegaly which is partially due to volume loss from old infarcts. Unclear if NPH. Offered LP in the past but did not want to proceed at that time per notes. Discussed again LP and get up and go test. Patient willing to proceed. Order placed. Return to clinic after testing.     I spent more than 45 minutes in this apt, examining the pt, reviewing the scans, reviewing notes from chart, discussing treatment options with risks and benefits and coordinating care. >50 % clinic time was spent in face to face counseling and coordinating care    Halley Quiroga        HPI:  85 yo male who presents with generalized weakness, imbalance, urinary incontinence (zeng in place) and confusion. This has been going on for several months to years. He uses a walker an/or wheelchair. He also had a zeng placed at his nursing home for his inconvenience long-standing. He is confused at times for several months. Recently admitted and evaluated by neurology. CT was stable showing ventriculomegaly but without change from previous. Evaluated for NPH in the past and he had refused LP at that time.     No nausea, emesis, blurred vision, seizures, headaches, numbness, new weakness.       Past Medical History:   Diagnosis Date     Anemia 10/06/2018     Cancer (H)      Elevated troponin level; Type III event 7/20/2019     Stroke (cerebrum) (H)      Syncope      Past Surgical History:   Procedure Laterality Date     EP PACEMAKER INSERT Left 10/4/2018    EP Pacemaker Insertion; Felipe Rangel MD;  Location: [facility]  Cath Lab     LUMBAR PUNCTURE FLUORO GUIDED DIAGNOSTIC  7/23/2019         REVIEW OF SYSTEMS:  ROS reviewed with pt as documented on pt health form of 8/15/2019.    Negative cardiac, pulmonary, hematological with exception of neurological as per HPi   .  No family hx of anesthetic reactions  .  No family hx of hypercoagulability .       MEDICATIONS:  Current Outpatient Medications   Medication Sig Dispense Refill     acetaminophen (TYLENOL) 500 MG tablet Take 1-2 tablets (500-1,000 mg total) by mouth every 4 (four) hours as needed.  0     apixaban (ELIQUIS) 5 mg Tab tablet Take 1 tablet (5 mg total) by mouth 2 (two) times a day. 60 tablet 0     calcium carbonate (OS-PADMINI) 600 mg calcium (1,500 mg) tablet Take 600 mg by mouth 2 (two) times a day. Take 2 tablets every morning and 1 tablet every night              cetirizine (ZYRTEC) 10 MG tablet Take 10 mg by mouth daily as needed for allergies.              furosemide (LASIX) 20 MG tablet Take 1 tablet (20 mg total) by mouth daily as needed. Take lasix 20 mg once a day if needed for leg swelling/edema 30 tablet 0     magnesium hydroxide (MILK OF MAG) 400 mg/5 mL Susp suspension Take 30 mL by mouth daily as needed.  0     metoprolol succinate (TOPROL XL) 50 MG 24 hr tablet Take 1 tablet (50 mg total) by mouth daily. 90 tablet 3     polyethylene glycol (MIRALAX) 17 gram packet Take 1 packet (17 g total) by mouth daily.  0     simvastatin (ZOCOR) 40 MG tablet Take 40 mg by mouth at bedtime.       vitamin A-vitamin C-vit E-min (OCUVITE) Tab tablet Take 1 tablet by mouth 2 (two) times a day.              No current facility-administered medications for this visit.          ALLERGIES/SENSITIVITIES:     No Known Allergies    PERTINENT SOCIAL HISTORY:  Social History     Socioeconomic History     Marital status:      Spouse name: Beronica     Number of children: None     Years of education: None     Highest education level: None   Occupational History     Employer: RETIRED  "  Social Needs     Financial resource strain: None     Food insecurity:     Worry: None     Inability: None     Transportation needs:     Medical: None     Non-medical: None   Tobacco Use     Smoking status: Former Smoker     Last attempt to quit: 1961     Years since quittin.6     Smokeless tobacco: Never Used   Substance and Sexual Activity     Alcohol use: Not Currently     Comment: occ     Drug use: No     Sexual activity: None   Lifestyle     Physical activity:     Days per week: 0 days     Minutes per session: None     Stress: None   Relationships     Social connections:     Talks on phone: None     Gets together: None     Attends Church service: None     Active member of club or organization: None     Attends meetings of clubs or organizations: None     Relationship status: None     Intimate partner violence:     Fear of current or ex partner: None     Emotionally abused: None     Physically abused: None     Forced sexual activity: None   Other Topics Concern     None   Social History Narrative     None         FAMILY HISTORY:  Family History   Problem Relation Age of Onset     Stroke Father      Heart attack Brother      Stroke Brother         PHYSICAL EXAM:   Constitutional: /78   Pulse 72   Resp 18   Ht 5' 10\" (1.778 m)   Wt 171 lb (77.6 kg)   BMI 24.54 kg/m       Mental Status: A & O in no acute distress. oriented x 3 with choices. Affect is appropriate.  Speech is fluent.  Recent and remote memory are impaired.  Attention span and concentration are impaired.     Cranial Nerves: CN1: grossly intact per patient recall. CN2: No funduscopic exam performed. CN3,4 & 6: Pupillary light response, lateral and vertical gaze normal.  No nystagmus.  Visual fields are full to confrontation. CN5: Intact to touch CN7: No facial weakness, smile, facial symmetry intact. CN8: Intact to spoken voice. CN9&10: Gag reflex, uvula midline, palate rises with phonation. CN11: Shoulder shrug 5/5 intact " bilaterally. CN12: Tongue midline and moves freely from side to side.     Motor: No pronator drift of upper extremity. Normal bulk and tone all muscle groups of upper and lower extremities.      Sensory: Sensation intact bilaterally to light touch.    Coordination:  Slow shuffling gait     Reflexes; biceps, knee/ ankle jerk intact. no hoffmans/ no    babinski/ clonus.    IMAGING: I personally reviewed all radiographic images            Cc:   Troy Ricardo MD  [address] [facility] [address]

## 2021-06-01 NOTE — TELEPHONE ENCOUNTER
Attempted to reach [facility] again today. I could never get anyone on the phone. [facility] even if I selects Nursing supervisor or

## 2021-06-01 NOTE — PROGRESS NOTES
"NEUROSURGERY FOLLOWUP  NOTE    Sumeet Grey comes today in f/u after prior apt on 8/15/19 for r/o NPH.  83 yo male who presents with generalized weakness, imbalance, urinary incontinence (zeng in place) and confusion.  Recent CT was stable to MRI from 2018 but progressed from 2008 showing ventriculomegaly which is partially due to volume loss from old infarcts. Unclear if NPH. Offered LP in the past but did not want to proceed at that time per notes. Discussed again LP and get up and go test. Patient willing to proceed. He has obtained his LP and testing and returns to discuss.     He was noted by PT to have no improvement.  Patient and spouse did not note any improvement in his symptoms.      The pts PMH, PSH, ROS, Meds, Allergies, SH, FH are all unchanged and summarized in the pts health history from last visit        PHYSICAL EXAM:   Constitutional: BP (!) 137/93   Pulse (!) 113   Ht 5' 10\" (1.778 m)   Wt 171 lb (77.6 kg)   SpO2 99%   BMI 24.54 kg/m       Mental Status: A & O in no acute distress.  Affect is appropriate.  Speech is fluent.  Recent and remote memory are intact.  Attention span and concentration are normal.     Cranial Nerves: CN1: grossly intact per patient recall. CN2: No funduscopic exam performed. CN3,4 & 6: Pupillary light response, lateral and vertical gaze normal.  No nystagmus.  Visual fields are full to confrontation. CN5: Intact to touch CN7: No facial weakness, smile, facial symmetry intact. CN8: Intact to spoken voice. CN9&10: Gag reflex, uvula midline, palate rises with phonation. CN11: Shoulder shrug 5/5 intact bilaterally. CN12: Tongue midline and moves freely from side to side.     Motor: No pronator drift of upper extremity. Normal bulk and tone all muscle groups of upper and lower extremities.     Sensory: Sensation intact bilaterally to light touch.     IMAGING:   I personally reviewed all radiographic images        CONSULTATION ASSESSMENT AND PLAN:    83 yo male who " presents with generalized weakness, imbalance, urinary incontinence (zeng in place) and confusion.  Recent CT was stable to MRI from 2018 but progressed from 2008 showing ventriculomegaly which is partially due to volume loss from old infarcts. Unclear if NPH. Underwent LP with opening pressure of 15 cmH20. 30 cc removed. Cx negative. No improvement with testing with PT. Patient reports no improvement. Do not recommend shunting at this time. He will return to clinic if new or worsening symptoms.     I spent more than 10 minutes in this apt, examining the pt, reviewing the scans, reviewing notes from chart, discussing treatment options with risks and benefits and coordinating care. >50 % clinic time was spent in face to face counseling and coordinating care    Halley Quiroga      CC:     Troy Ricardo MD  [address] [facility] [id number]

## 2021-06-01 NOTE — TELEPHONE ENCOUNTER
Appt time Ashlie henson was no longer available. Scheduled patient to see Dr. Quiroga in [facility] the following week.

## 2021-06-01 NOTE — PROGRESS NOTES
Patient is here with wife for a follow up after Lumbar puncture. Today he report that he is doing well and has no new issues or concerns.  Magaly Bragg, CMA

## 2021-06-02 VITALS — WEIGHT: 174.5 LBS | BODY MASS INDEX: 24.98 KG/M2 | HEIGHT: 70 IN

## 2021-06-02 VITALS — BODY MASS INDEX: 25.48 KG/M2 | WEIGHT: 178 LBS | HEIGHT: 70 IN

## 2021-06-02 VITALS — HEIGHT: 70 IN | WEIGHT: 170 LBS | BODY MASS INDEX: 24.34 KG/M2

## 2021-06-02 NOTE — TELEPHONE ENCOUNTER
Type: Routine pacemaker remote transmission.   Presenting Rhythm: Sinus (AS-VS), rate in the 90s.   Lead/Battery status: Stable.   Arrhythmias: Since 7/2/19, 63 monitored mode switch episodes detected, longest in logbook has duration of ~18.5 hours, burden 5.1%, v-rates during mode switch are >/=120bpm~80%, available EGMs confirm atrial fibrillation/atrial flutter (AF/AFL). 4 ventricular high rate and 68 Fast A&V episodes detected, some appear PSVT and some appear RVR to AF/AFL, r-wave morphology appears different during AP->VS, however morphology appears similar when comparing PSVT to RVR episodes, thus presume all are atrial driven arrhythmias.   Anticoagulant: Per EMR, patient is taking Eliquis.   Comments: Normal device function. Routed to Device RN for v-rates during AF. CAH      LV to check for symptoms. Then will route to Domenica for potential further med titration.

## 2021-06-03 VITALS
BODY MASS INDEX: 24.48 KG/M2 | SYSTOLIC BLOOD PRESSURE: 137 MMHG | HEIGHT: 70 IN | DIASTOLIC BLOOD PRESSURE: 93 MMHG | OXYGEN SATURATION: 99 % | WEIGHT: 171 LBS | HEART RATE: 113 BPM

## 2021-06-03 VITALS — WEIGHT: 171 LBS | BODY MASS INDEX: 24.48 KG/M2 | HEIGHT: 70 IN

## 2021-06-03 VITALS — BODY MASS INDEX: 24.54 KG/M2 | WEIGHT: 171 LBS

## 2021-06-04 VITALS — HEIGHT: 70 IN | BODY MASS INDEX: 24.54 KG/M2 | WEIGHT: 185 LBS | BODY MASS INDEX: 26.54 KG/M2

## 2021-06-06 NOTE — TELEPHONE ENCOUNTER
"Type: pacemaker alert remote transmission for v-rates >/=100bpm during AT/AF.  Presenting rhythm: sinus 100 bpm.  Battery/lead status: stable.  Arrhythmias: since 1/7/20; 4 mode switch episodes, EGMs confirm AT/AF, ~15.5hrs noted on 3/17/20, v-rates >/=120bpm ~70-80%, AF burden 1.1%. 6 fast A&V episodes, appear to be RVR during AF.  Anticoagulant: apixaban.  Comments: normal pacemaker function. Routed to device RN for review. CARINA    Addendum: Transmission reviewed,extended episode of atrial fibrillation (AF) on 03/17/2020, heart rates during AF reached sustained rates >160s bpm, [place] remains low at 1.1%.  Agree with report.  Telephone call to Mr. Grey, he states, \"I didn't feel any different yesterday [03/17/2020] than any other day.\"  He denies shortness of breath, light headedness, dizziness, pre-syncope, syncope, chest pain, diaphoresis, and nausea.  He affirms taking his  medications as prescribed, including apixaban and metoprolol.  While on the phone with Mr. Grey he tells me, \"I have a problem today though - I've got blood in my urine.  I just called for the nurse.\"  Mr. Grey currently resides at [facility] assisted-living facility in [place], [place].  The nurse arrived to Mr. Grey's apartment while I was on the phone with him, his wife and daughter were also present.  I thanked him for his time so his acute issue could be addressed. Routed to Dr. Rangel for review.  Vero Cooper, RN, MA  Device Nurse  [facility]            "

## 2021-06-07 NOTE — PROGRESS NOTES
"Type: pacemaker alert remote transmission for v-rates >/=100bpm during AT/AF.  Presenting rhythm: sinus 100 bpm.  Battery/lead status: stable.  Arrhythmias: since 1/7/20; 4 mode switch episodes, EGMs confirm AT/AF, ~15.5hrs noted on 3/17/20, v-rates >/=120bpm ~70-80%, AF burden 1.1%. 6 fast A&V episodes, appear to be RVR during AF.  Anticoagulant: apixaban.  Comments: normal pacemaker function. Routed to device RN for review. CARINA     Addendum: Transmission reviewed,extended episode of atrial fibrillation (AF) on 03/17/2020, heart rates during AF reached sustained rates >160s bpm, [place] remains low at 1.1%.  Agree with report.  Telephone call to Mr. Grey, he states, \"I didn't feel any different yesterday [03/17/2020] than any other day.\"  He denies shortness of breath, light headedness, dizziness, pre-syncope, syncope, chest pain, diaphoresis, and nausea.  He affirms taking his  medications as prescribed, including apixaban and metoprolol.  While on the phone with Mr. Grey he tells me, \"I have a problem today though - I've got blood in my urine.  I just called for the nurse.\"  Mr. Grey currently resides at [facility] assisted-living facility in [place], [place].  The nurse arrived to Mr. Grey's apartment while I was on the phone with him, his wife and daughter were also present.  I thanked him for his time so his acute issue could be addressed. Routed to Dr. Rangel for review.  Vero Cooper, RN, MA  Device Nurse  [facility]    Will route to primary cardiologist; Dr Metzger  "

## 2021-06-07 NOTE — PROGRESS NOTES
----- Message from Kiara Andersen RDCS sent at 4/10/2020  9:09 AM CDT -----  Regarding: device Rn review  [facility]  Alert for AF, directive to route if burden >8% and V rates >120 bpm 40%. Dafter is 7.8%, V-rates >120 bpm 50%.     Type: alert remote pacemaker transmission for AF exceeding burden.  Presenting rhythm: atrial fib/flutter with ventricular sensing rate 120 bpm.  Battery/lead status: stable  Arrhythmias: since 3/18/20, three mode switches, EGMs confirm atrial fib/flutter, longest in progress since 4/8/20 at 8:37AM, burden 7.8%, V-rates >/=120 bpm 50%. Four fast A&V and one nonsustained ventricular high rate episode; EGMs suggest RVR.   Anticoagulant: apixaban  Comments: normal pacemaker function. Routed to device RN. prd      Transmission reviewed, agree with above. Known PAF with slight increase in AF burden due to current episode, in progress since 4/8. V-rates on faster side at times with some bursts of RVR noted on logbook. Patient takes Eliquis, and Toprol XL 50 mg daily per [facility] list. Appear patient was just in ER for Hematuria, but sounds as though that issue has resolved, new zeng catheter was placed and urine was no longer bloody.     Call placed to patient to assess/review and verify meds. He denies any awareness of AF, no palpitations, shortness of breath, chest discomfort etc.He confirms he is taking Eliquis and Toprol XL 50 mg as listed.  Advised to call with any troublesome symptoms.      Patient of Dr Metzger's (out of clinic this week) and Dr. Rangel. Will  review with Dr. Rangel for any further recommendations.    Plan  Prolonged episode of AF; some elevated VR  Takes Eliquis 5 mg twice daily  Increase Toprol to 100 mg daily  Follow-up with either me or Dr. Metzger in 3 months

## 2021-06-07 NOTE — TELEPHONE ENCOUNTER
Elsy- I received a call back from Kerri VAZ at the [facility] for pt.  She gave me a fax number to send the updated medication order of metoprolol 100mg daily.  I have entered the new order and sent to the [facility] in [place]. Can you print out the order and fax it to Kerri VAZ at [facility].     Fax # [phone].    Thank you!!    Shannan

## 2021-06-07 NOTE — TELEPHONE ENCOUNTER
Spoke to Daughter Bhumi, who stated that the nurses at [facility] on [address] Fill her dad's medications.  She also gave me the contact number of Kassidy a nurse who works there.  I have called Kassidy and IZAIAH for her to call back regarding medication change.  Typically long term residence require a order be faxed over with medication changes.  Will await for call back to get fax number.      Daughter also stated the pharmacy has changed and knew it was called [facility], I have called around the two different [facility] pharmacies in the area and found that the pharmacy in [place] fills prescriptions for [facility] on [address]  Sent an updated medication order to new pharmacy.      Informed daugther Bhumi that currently our schedule for July is not out yet to make the 3 month follow up, I will send message to schedulers to enter recall for 3 month F/U.

## 2021-06-07 NOTE — TELEPHONE ENCOUNTER
Spoke to patient and wife.  They stated a nurse does his medications and she just left.  They wrote down medication instructions and will call the nurse to come back to make changes.    Will updated medication in epic.  Device schedulers- please enter recall into pts chart for 3 months with Dr. Rangel.    EP RN phone number left to have nurse call if there are any questions or concerns.    Informed pt and wife I will postpone this message for next week to confirm changes were made.    Shannan

## 2021-06-07 NOTE — TELEPHONE ENCOUNTER
Patient of Dr Metzger's (out of clinic this week) and Dr. Rangel. Will  review with Dr. Rangel for any further recommendations.    Plan  Prolonged episode of AF; some elevated VR  Takes Eliquis 5 mg twice daily  Increase Toprol to 100 mg daily  Follow-up with either me or Dr. Metzger in 3 months

## 2021-06-09 NOTE — PATIENT INSTRUCTIONS - HE
Sumeet Grey    Thank you for coming to the [facility] today for a cardiac evaluation  It was my pleasure to see you today  A good contact for any questions would be Lucina Alexandra  RN @ [phone]       Pacemaker evaluation shows battery should last another 11  years  Almost no atrial fibrillation since mid June  Reviewed medicines; should continue Eliquis 5 mg twice daily as well as digoxin, metoprolol and other medications  Have pacemaker check through transtelephonic monitoring every 3 months  Appointment has been scheduled for the urologist to discuss your incontinence and need for potential catheterization  Follow-up in 1 year for comprehensive cardiac arrhythmia device assessment

## 2021-06-09 NOTE — PROGRESS NOTES
Clinic Care Coordination Contact  [facility]/Voicemail       Clinical Data: Care Coordinator Outreach  Outreach attempted x 1.  Left message on patient's voicemail with call back information and requested return call.  Plan:  Care Coordinator will try to reach patient again in 1-2 business days.

## 2021-06-09 NOTE — PROGRESS NOTES
Review of systems done with patient over the phone and all within normal limits. He gets his medications from the nurse and did not know what he is currently taking.

## 2021-06-09 NOTE — PROGRESS NOTES
Clinic Care Coordination Contact  [facility]/Voicemail       Clinical Data: Care Coordinator Outreach  Outreach attempted x 1.  Left message on patient's voicemail with call back information and requested return call.  Plan:Care Coordinator will try to reach patient again in 3-5 business days.

## 2021-06-09 NOTE — PROGRESS NOTES
Clinic Care Coordination Contact  Community Health Worker Initial Outreach    CHW Initial Information Gathering:  Current living arrangement:: I live in assisted living, I live in a private home with spouse  Supplies used at home:: Hearing Aid Batteries, Compression Stockings  Equipment Currently Used at Home: walker, rolling, shower chair, grab bar, tub/shower, grab bar, toilet  Informal Support system:: Spouse, Family  No PCP office visit in Past Year: Yes  Transportation means:: Family    Patient accepts CC: Yes     [facility] patient CHW was added to the chart that is following the patient .

## 2021-06-09 NOTE — PROGRESS NOTES
Clinic Care Coordination Contact    Community Health Worker Follow Up    Goals:   Goals Addressed                 This Visit's Progress       Patient Stated      Other (pt-stated)   On track     Goal Statement:  I want to stay out of the hospital for the next 90 days.  Date Goal set:  6/26/2020  Barriers:   Recent hospitalization  Strengths:  Able to identify and advocate for needs, good support system from family  Date to Achieve By:  9/7/20 (based on hospital discharge date of 6/9/20)  Patient expressed understanding of goal:  Yes  Action steps to achieve this goal:  1. I will attend all my appts as scheduled, and recommended follow up appts  2. I will call my clinic if I start to feel sick or notice any change(s) in my health, and schedule an appt if needed  3. I will call the triage nurse line for advice, before going to the hospital  4. I will go to  or Walk-In-Clinic before going to the hospital, if I am unable to get an appt with my PCP  5. I will update the Community Healthcare Worker during outreach calls and ask for additional support or resources, if needed              CHW Next Follow-up: in two weeks     CHW Plan: to see if there is any thing that might help out with the cost of depends because medicare will not cover it.

## 2021-06-10 NOTE — PROGRESS NOTES
Clinic Care Coordination Contact  [facility]/Voicemail       Clinical Data: Care Coordinator Outreach  Outreach attempted x 2.  Left message on patient's voicemail with call back information and requested return call.  Plan:Care Coordinator will try to reach patient again in 10 business days.

## 2021-06-10 NOTE — PROGRESS NOTES
"Clinic Care Coordination Contact    Follow Up Progress Note - BPCI-A One Month Follow Up      Background:  Patient was hospitalized at [facility] from 6/5/20 to 6/9/20 with sepsis due to UTI (hx of recurrent UTIs), new onset seizure, cognitive impairment.    Assessment:  Follow up call completed with pt's daughter, Bhumi (consent to communicate in chart).  Bhumi states her father is doing well, no longer has the urinary catheter, it was removed in the hospital.  States he wears briefs as he's incontinent.  States a few weeks ago the staff at his [facility] said he couldn't void standing up  but he could lying down; [facility] staff thought maybe his prostate was the cause, but he had prostate surgery \"years ago,\" she's not sure if that's changed or not at this point but will follow up with [facility].      Pt is scheduled for a virtual visit with urology this morning at 11:10am and daughter, Bhumi, will be on that call as well as the [facility] DON and her parents.      A-Fib - pt had cardiology virtual visit follow up 7/15/20    Goals addressed this encounter:   Goals Addressed                 This Visit's Progress       Patient Stated      Other (pt-stated)   60%     Goal Statement:  I want to stay out of the hospital for the next 90 days.  Date Goal set:  6/26/2020  Barriers:   Recent hospitalization  Strengths:  Able to identify and advocate for needs, good support system from family  Date to Achieve By:  9/7/20 (based on hospital discharge date of 6/9/20)  Patient expressed understanding of goal:  Yes  Action steps to achieve this goal:  1. I will attend all my appts as scheduled, and recommended follow up appts  2. I will call my clinic if I start to feel sick or notice any change(s) in my health, and schedule an appt if needed  3. I will call the triage nurse line for advice, before going to the hospital  4. I will go to  or Walk-In-Clinic before going to the hospital, if I am unable to get an appt with my PCP  5. I will update the " Community Healthcare Worker during outreach calls and ask for additional support or resources, if needed               Intervention/Education provided during outreach:  N/A          Plan:  RN Care Coordinator will follow up with daughter, Bhumi, again in one month, per BPCI-A guidelines.       Heena Caldwell RN Clinic Care Coordinator

## 2021-06-10 NOTE — PROGRESS NOTES
Clinic Care Coordination Contact    Follow Up Progress Note     Follow Up Progress Note - BPCI-A Two Month Follow Up      Background:  Patient was hospitalized at [facility] from 6/5/20 to 6/9/20 with sepsis due to UTI (hx of recurrent UTIs), new onset seizure, cognitive impairment     Assessment: AZAM called and spoke with patient's daughter, Bhumi who shares that she didn't really have a lot of updates but would share what she could.    Bhumi states that her Dad is very frail and did fall when her Mom was assisting him into a chair. Bhumi shares that patient's last scheduled virtual urology appt(late July) never happened because the doctor did not show up. She still needs to get this rescheduled. Bhumi states that her Dad is still getting speech therapy.    AZAM also called and left a message for ТАТЬЯНА Nicole at [facility] to see if she had any updates on patient.    Goals       Other (pt-stated)      Goal Statement:  I want to stay out of the hospital for the next 90 days.  Date Goal set:  6/26/2020  Barriers:   Recent hospitalization  Strengths:  Able to identify and advocate for needs, good support system from family  Date to Achieve By:  9/7/20 (based on hospital discharge date of 6/9/20)  Patient expressed understanding of goal:  Yes  Action steps to achieve this goal:  1. I will attend all my appts as scheduled, and recommended follow up appts  2. I will call my clinic if I start to feel sick or notice any change(s) in my health, and schedule an appt if needed  3. I will call the triage nurse line for advice, before going to the hospital  4. I will go to  or Walk-In-Clinic before going to the hospital, if I am unable to get an appt with my PCP  5. I will update the Community Healthcare Worker during outreach calls and ask for additional support or resources, if needed                    Plan:   -CHW will complete next outreach according to BPCI-A guidelines.  -CC will complete final outreach in one month.

## 2021-06-10 NOTE — PROGRESS NOTES
Clinic Care Coordination Contact  [facility]/Voicemail       Clinical Data: Care Coordinator Outreach  Outreach attempted x 2.  Left message on patient's voicemail with call back information and requested return call.  Plan:Care Coordinator will try to reach patient again in 3-5 business days.

## 2021-06-11 NOTE — PROGRESS NOTES
Clinic Care Coordination Contact    Situation:  Patient chart reviewed by RN Care Coordinator for BPCI-A graduation approval.    Background:  Patient was hospitalized at [facility] from 6/5/20 to 6/9/20 with sepsis due to UTI (hx of recurrent UTIs), new onset seizure, cognitive impairment, and has been followed by Care Coordination for 90 day post hospitalization monitoring, per BPCI-A guidelines.            Assessment:  90 day BPCI-A monitoring ended 9/9/20.     Plan/Recommendations:  BPCI-A monitoring completed.  CCRC team removed from care team, and BPCI-A episode resolved.  No further follow up needed.  Graduation letter sent via mail.      Heena Caldwell RN Clinic Care Coordinator

## 2021-06-11 NOTE — PROGRESS NOTES
Clinic Care Coordination Contact    Community Health Worker Follow Up    Goals:   Goals Addressed                 This Visit's Progress       Patient Stated      Other (pt-stated)   50%     Goal Statement:  I want to stay out of the hospital for the next 90 days.  Date Goal set:  6/26/2020  Barriers:   Recent hospitalization  Strengths:  Able to identify and advocate for needs, good support system from family  Date to Achieve By:  9/7/20 (based on hospital discharge date of 6/9/20)  Patient expressed understanding of goal:  Yes  Action steps to achieve this goal:  1. I will attend all my appts as scheduled, and recommended follow up appts  2. I will call my clinic if I start to feel sick or notice any change(s) in my health, and schedule an appt if needed  3. I will call the triage nurse line for advice, before going to the hospital  4. I will go to  or Walk-In-Clinic before going to the hospital, if I am unable to get an appt with my PCP  5. I will update the Community Healthcare Worker during outreach calls and ask for additional support or resources, if needed              CHW Next Follow-up: in two weeks     CHW Plan: to see if any new needs come up     Patient stated that he is doing ok at this time and did not need anything.

## 2021-06-16 NOTE — TELEPHONE ENCOUNTER
Telephone Encounter by Kasey Jones RN at 7/3/2019  3:13 PM     Author: Kasey Jones RN Service: -- Author Type: Registered Nurse    Filed: 7/3/2019  3:24 PM Encounter Date: 7/3/2019 Status: Signed    : Kasey Jones RN (Registered Nurse)       Type: routine pacemaker remote transmission.   Presenting rhythm: sinus 60 bpm.  Battery/lead status: stable.  Arrhythmias: since 5/29/19; 3 mode switch episodes and 17 fast A&V episodes, available EGMs show AF with RVR, longest lasting 30hrs on 6/11/19, v-rates >/=120bpm ~95%, AF burden 3.6%. One ventricular high rate episode, appears to be PAT.   Anticoagulant: warfarin.  Comments: normal pacemaker function. Routed to device RN for review. CARINA     Transmission reviewed, known PAF with some tendency toward RVR. Previously reviewed by Dr. Rangel and are watching AF burdens, which is now ranging 3-5% on remote. Last 2 AF episodes were 28 and 30 hours long with average V-rates of 130s, Max 170s. V-rate Histograms showing increase in V-rate trends (see below). Historically has been asymptomatic. Call placed to patient today, LVM for call back to assess.     Will review with Dr. Claire Jones RN

## 2021-06-17 NOTE — TELEPHONE ENCOUNTER
Telephone Encounter by Kasey Jones RN at 4/10/2020 11:20 AM     Author: Kasey Jones RN Service: -- Author Type: Registered Nurse    Filed: 4/10/2020 11:39 AM Encounter Date: 4/10/2020 Status: Addendum    : Kasey Jones RN (Registered Nurse)    Related Notes: Original Note by Kasey Jones RN (Registered Nurse) filed at 4/10/2020 11:33 AM       ----- Message from Kiara Andersen RDCS sent at 4/10/2020  9:09 AM CDT -----  Regarding: device Rn review  Carelink  Alert for AF, directive to route if burden >8% and V rates >120 bpm 40%. [place] is 7.8%, V-rates >120 bpm 50%.    Type: alert remote pacemaker transmission for AF exceeding burden.  Presenting rhythm: atrial fib/flutter with ventricular sensing rate 120 bpm.  Battery/lead status: stable  Arrhythmias: since 3/18/20, three mode switches, EGMs confirm atrial fib/flutter, longest in progress since 4/8/20 at 8:37AM, burden 7.8%, V-rates >/=120 bpm 50%. Four fast A&V and one nonsustained ventricular high rate episode; EGMs suggest RVR.   Anticoagulant: apixaban  Comments: normal pacemaker function. Routed to device RN. prd     Transmission reviewed, agree with above. Known PAF with slight increase in AF burden due to current episode, in progress since 4/8. V-rates on faster side at times with some bursts of RVR noted on logbook. Patient takes Eliquis, and Toprol XL 50 mg daily per Epic Med list. Appear patient was just in ER for Hematuria, but sounds as though that issue has resolved, new zeng catheter was placed and urine was no longer bloody.     Call placed to patient to assess/review and verify meds. He denies any awareness of AF, no palpitations, shortness of breath, chest discomfort etc.He confirms he is taking Eliquis and Toprol XL 50 mg as listed.  Advised to call with any troublesome symptoms.     Patient of Dr Metzger's (out of clinic this week) and Dr. Rangel. Will  review with Dr. Rangel for any further recommendations.     Kasey BALDWIN  ТТАЬЯНА Jones

## 2021-06-19 NOTE — LETTER
Letter by Delai Gunter at      Author: Delia Gunter Service: -- Author Type: --    Filed:  Encounter Date: 7/2/2019 Status: (Other)         Sumeet Grey  [address] [place] [address]      July 3, 2019      Dear Mr. Grey,    RE: Remote Results    We are writing to you regarding your recent Remote Pacemaker check from home. Your transmission was received successfully. Battery status is satisfactory at this time.     Your results are being reviewed.  You will be contacted if further information is necessary.     Your next device appointment will be a remote check on October 7, 2019; this will occur automatically.    To schedule or reschedule, please call [phone] and press 1.    NOTE: If you would like to do an extra transmission, please call [phone] and press 3 to speak to a nurse BEFORE transmitting. This ensures that the Device Clinic staff is aware of the reason you are sending a transmission, and can follow-up with you after it has been reviewed.    We will be checking your implanted device from home (remotely) every three months unless otherwise instructed. We will need to see you in the clinic at least once a year. You may need to be seen in the clinic sooner depending on the results of your check.    Please be aware:    The follow-up schedule is like a Physician prescription.    Your remote monitor is paired to your specific implanted device.      Sincerely,    [facility] Device [facility]

## 2021-06-19 NOTE — LETTER
Letter by Delia Gunter at      Author: Delia Gunter Service: -- Author Type: --    Filed:  Encounter Date: 4/1/2019 Status: (Other)         Sumeet Grey  [address] [place] [address]      April 1, 2019      Dear Mr. Grey,    RE: Remote Results    We are writing to you regarding your recent Remote Pacemaker check from home. Your transmission was received successfully. Battery status is satisfactory at this time.     Your results are being reviewed.    Your next device appointment will be a remote check on July 3, 2019.  You can choose the time of day you wish to transmit.    To schedule or reschedule, please call [phone] and press 1.    NOTE: If you would like to do an extra transmission, please call [phone] and press 3 to speak to a nurse BEFORE transmitting. This ensures that the Device Clinic staff is aware of the reason you are sending a transmission, and can follow-up with you after it has been reviewed.    We will be checking your implanted device from home (remotely) every three months unless otherwise instructed. We will need to see you in the clinic at least once a year. You may need to be seen in the clinic sooner depending on the results of your check.    Please be aware:    The follow-up schedule is like a Physician prescription.    Your remote monitor is paired to your specific implanted device.      Sincerely,    [facility] Device [facility]

## 2021-06-19 NOTE — LETTER
Letter by Comfort Ho EPS at      Author: Comfort Ho EPS Service: -- Author Type: --    Filed:  Encounter Date: 10/6/2019 Status: Signed         Sumeet Grey  [address] [place] [address]      October 7, 2019      Dear Mr. Grey,    RE: Remote Results    We are writing to you regarding your recent Remote Pacemaker check from home. Your transmission was received successfully. Battery status is satisfactory at this time.     Your results are being reviewed.  You will be contacted if further information is necessary.     Your next device appointment will be a clinic visit on January 7, 2020 at 9:50 am at our  downtown [facility] location, [address] [place] [address].    To schedule or reschedule, please call [phone] and press 1.    NOTE: If you would like to do an extra transmission, please call [phone] and press 3 to speak to a nurse BEFORE transmitting. This ensures that the Device Clinic staff is aware of the reason you are sending a transmission, and can follow-up with you after it has been reviewed.    We will be checking your implanted device from home (remotely) every three months unless otherwise instructed. We will need to see you in the clinic at least once a year. You may need to be seen in the clinic sooner depending on the results of your check.    Please be aware:    The follow-up schedule is like a Physician prescription.    Your remote monitor is paired to your specific implanted device.      Sincerely,    [facility] Device Clinic

## 2021-06-20 NOTE — LETTER
Letter by Heena Caldwell RN at      Author: Heena Caldwell RN Service: -- Author Type: --    Filed:  Encounter Date: 9/14/2020 Status: (Other)       CARE COORDINATION  [facility]  [address] [place] [address]         September 14, 2020      Sumeet Grey  [facility] [employer] [facility] [address] [place] [address]      Dear Sumeet,    Your Care Team congratulates you on your journey to maintain wellness. This document will help guide you on your journey to maintain a healthy lifestyle.  You can use this to help you overcome any barriers you may encounter.  If you should have any questions or concerns, you can contact the members of your Care Team or contact your Primary Care Clinic for assistance.    My Access Plan  Medical Emergency 911   Primary Clinic Line No Primary Care Provider - [phone]   24 Hour Appointment Line [phone] or  [phone]) (toll-free)   24 Hour Nurse Line [phone]   Preferred Urgent Care     Preferred Hospital     Preferred Pharmacy [facility] - [place], [place] - [address]      Behavioral Health Crisis Line The National Suicide Prevention Lifeline at 1-706.602.8441 or 626     My Care Team Members  Patient Care Team       Relationship Specialty Notifications Start End    Provider, No Primary Care PCP - General   7/9/20     Phone: [phone]                       Goals        Patient Stated    ? COMPLETED: Other (pt-stated)      Goal Statement:  I want to stay out of the hospital for the next 90 days.  Date Goal set:  6/26/2020  Barriers:   Recent hospitalization  Strengths:  Able to identify and advocate for needs, good support system from family  Date to Achieve By:  9/7/20 (based on hospital discharge date of 6/9/20)  Patient expressed understanding of goal:  Yes  Action steps to achieve this goal:  1. I will attend all my appts as scheduled, and recommended follow up appts  2. I will call my  clinic if I start to feel sick or notice any change(s) in my health, and schedule an appt if needed  3. I will call the triage nurse line for advice, before going to the hospital  4. I will go to  or Walk-In-Clinic before going to the hospital, if I am unable to get an appt with my PCP  5. I will update the Community Healthcare Worker during outreach calls and ask for additional support or resources, if needed               Advance Care Plans/Directives Type:      Thank you for providing us with your Advance Directive. We will keep this on file and recommend that it be updated every 2 years, if your health situation changes, if there is a death of one of your health care agents, and/or if there are changes in your marital status.    It has been your Clinic Care Team's pleasure to work with you on your goals.    Regards,  Your Clinic Care Team

## 2021-06-20 NOTE — PROGRESS NOTES
DEVICE CLINIC RN POST-OP NOTE    Reason for visit: Post-op dual chamber pacemaker check and wound assessment     Device: Medtronic Trosky XT DR MRI  Procedure date: 10/4/2018  Implant Diagnosis: syncope, bradycardia  Implanting Physician: Dr. Felipe Rangel    Assessment  Subjective: No significant concerns expressed today. Patient is feeling well and denies pain and discomfort.   Vitals:   Vitals:    10/11/18 1008   BP: 118/64   Pulse: 60   Resp: 16   Temp: 97.4  F (36.3  C)     Heart Sounds: normal  Lung Sounds: clear to auscultation bilaterally  Incision/device pocket: The steri-strips were removed from the incision and it was cleansed with adhesive remover and alcohol wipes. The incision is clean, dry and intact. There are no signs of infection present.    Device Findings  Interrogation of device reveals normal sensing and capture thresholds  See the Paceart Report for a full summary of the device information.     Patient Education  Sumeet Grey was accompanied today by his daughter, Bhumi.     [facility]'s Partnership Agreement for Device Patients and Post-operative Checklist were presented and reviewed with patient at today's appointment.    Signs and symptoms of infection, poor wound healing, and device function were reviewed. Range of motion and weight restrictions for the left side are four weeks. He was issued a Qnovo remote monitor and will receive the monitor via mail. He was instructed on its set-up and use for remote monitoring today. All instructions were reviewed with the patient at today s visit.       Plan  Remote on 11/6/18  Return with Device RN for 3-month post-op on 1/9/19    Yuliana Antoine RN BSN  [facility] Device [facility]

## 2021-06-20 NOTE — ANESTHESIA PREPROCEDURE EVALUATION
Anesthesia Evaluation      Patient summary reviewed     Airway    Pulmonary    (+) a smoker                         Cardiovascular   (+) dysrhythmias, , hypercholesterolemia,      Neuro/Psych      Comments: Syncope/LOC  Possible hydrocephalus    Endo/Other    (-) no obesity     GI/Hepatic/Renal            Dental                         Anesthesia Plan  Planned anesthetic: MAC  GA prn  ASA 3   Induction: intravenous     Anesthesia plan special considerations: antiemetics,

## 2021-06-20 NOTE — LETTER
Letter by Nazia Fung RN at      Author: Nazia Fung RN Service: -- Author Type: --    Filed:  Encounter Date: 6/26/2020 Status: (Other)       Care Plan  About Me:    Patient Name:  Sumeet Grey    YOB: 1935  Age:         84 y.o.   [facility] MRN:    [id number] Telephone Information:  Home Phone [phone]   Mobile [phone]       Address:  [address] [place] [address] Email address:  No e-mail address on record      Emergency Contact(s)  Extended Emergency Contact Information      Name: Janelle Grey  Address:       [address] [place] [address]  Home Phone Number: [phone]  Relation: Spouse      Name: [place] [address]  Relation: Child          Primary language:  English     needed? No   [employer] [employer] [facility]:  [phone] op. 1  Other communication barriers:    Preferred Method of Communication:     Current living arrangement: I live in assisted living, I live in a private home with spouse  Mobility Status/ Medical Equipment: Independent w/Device    Health Maintenance  Health Maintenance Reviewed: Up to date    My Access Plan  Medical Emergency 911   Primary Clinic Line Troy Ricardo MD - [phone]   24 Hour Appointment Line [phone] or  [phone]) (toll-free)   24 Hour Nurse Line [phone] (toll-free)   Preferred Urgent Care     Preferred Hospital [facility]  [phone]   Preferred Pharmacy [facility] [place] [id number] [facility]      Behavioral Health Crisis Line The National Suicide Prevention Lifeline at 1-723.211.3330 or 911             My Care Team Members  Patient Care Team       Relationship Specialty Notifications Start End    Troy Ricardo MD PCP - General Family Medicine  6/28/19     Phone: [phone] Fax: [phone] [address] [facility] [address]    Provider, No Primary Care     10/2/18     Delaney Alegre CHW Community Health Worker Primary Care - [facility] Admissions 6/17/20     [phone]    Phone: [phone] Fax: [phone]        Heena Caldwell, RN Registered Nurse Primary Care - [facility] Ad[facility] 6/26/20             My Care Plans  Self Management and Treatment Plan  Goals and (Comments)  Goals        General    Other (pt-stated)     Notes - Note edited  6/26/2020  1:44 PM by Nazia Fung RN    Goal Statement:  I want to stay out of the hospital for the next 90 days.  Date Goal set:  6/26/2020  Barriers:   Recent hospitalization  Strengths:  Able to identify and advocate for needs, good support system from family  Date to Achieve By:  9/7/20 (based on hospital discharge date of 6/9/20)  Patient expressed understanding of goal:  Yes  Action steps to achieve this goal:  1. I will attend all my appts as scheduled, and recommended follow up appts  2. I will call my clinic if I start to feel sick or notice any change(s) in my health, and schedule an appt if needed  3. I will call the triage nurse line for advice, before going to the hospital  4. I will go to  or Walk-In-Clinic before going to the hospital, if I am unable to get an appt with my PCP  5. I will update the Community Healthcare Worker during outreach calls and ask for additional support or resources, if needed                 Advance Care Plans/Directives Type:        My Medical and Care Information  Problem List   Patient Active Problem List   Diagnosis   ? Chronic anticoagulation   ? Mixed hyperlipidemia   ? Symptomatic bradycardia   ? RBBB   ? Cardiac pacemaker, dual, in situ   ? Elevated troponin level; Type III event   ? Weakness   ? Paroxysmal atrial fibrillation (H)   ? Urinary tract infection associated with indwelling urethral catheter, initial encounter (H)   ? Anemia   ? Normal pressure hydrocephalus (H)   ? Sepsis (H)   ? Breakthrough seizure (H)   ? New onset seizure (H)   ? Atrial fibrillation with  rapid ventricular response (H)   ? Sinus node dysfunction (H)      Current Medications and Allergies:  See printed Medication Report.    Care Coordination Start Date: No linked episodes   Frequency of Care Coordination:     Form Last Updated: 06/26/2020

## 2021-06-20 NOTE — LETTER
Letter by Delia Gunter at      Author: Delia Gunter Service: -- Author Type: --    Filed:  Encounter Date: 3/18/2020 Status: (Other)         Sumeet Grey  [address] [place] [address]      March 18, 2020      Dear Mr. Grey,    RE: Remote Results    We are writing to you regarding your recent Remote Pacemaker check from home. Your transmission was received successfully. Battery status is satisfactory at this time.     Your results are being reviewed.  You will be contacted if further information is necessary.     Your next device appointment will be a remote check on June 17, 2020; this will occur automatically.    To schedule or reschedule, please call [phone] and press 1.    NOTE: If you would like to do an extra transmission, please call [phone] and press 3 to speak to a nurse BEFORE transmitting. This ensures that the Device Clinic staff is aware of the reason you are sending a transmission, and can follow-up with you after it has been reviewed.    We will be checking your implanted device from home (remotely) every three months unless otherwise instructed. We will need to see you in the clinic at least once a year. You may need to be seen in the clinic sooner depending on the results of your check.    Please be aware:    The follow-up schedule is like a Physician prescription.    Your remote monitor is paired to your specific implanted device.      Sincerely,    [facility]

## 2021-06-20 NOTE — LETTER
Letter by Nazia Fung RN at      Author: Nazia Fung RN Service: -- Author Type: --    Filed:  Encounter Date: 6/26/2020 Status: (Other)       CARE COORDINATION    June 26, 2020    Sumeet Grey  [address]   [place] [address]      Dear Sumeet,    I am a clinic care coordinator  who works at [facility]. I wanted to thank you for spending the time to talk with me.  Below is a description of clinic care coordination and how I can further assist you.      The clinic care coordination team is made up of a registered nurse,  and community health worker who understand the health care system. The goal of clinic care coordination is to help you manage your health and improve access to the health care system in the most efficient manner. The team can assist you in meeting your health care goals by providing education, coordinating services, strengthening the communication among your providers and supporting you with any resource needs.    Please feel free to contact the Community Health Worker Delaney Rooney at [phone] with any questions or concerns. We are focused on providing you with the highest-quality healthcare experience possible and that all starts with you.     Sincerely,     Nazia Fung RN  Clinic Care Coordination    Enclosed: I have enclosed a copy of the Care Plan. This has helpful information and goals that we have talked about. Please keep this in an easy to access place to use as needed.

## 2021-06-20 NOTE — LETTER
Letter by Delaney Rooney CHW at      Author: Delaney Rooney CHW Service: -- Author Type: --    Filed:  Encounter Date: 6/17/2020 Status: (Other)                        This letter is to give you information only. No action is required on your part.                               Beneficiary Notification Letter - BPCI Advanced                     Your Doctor or Hospital Has Joined [facility]ica[facility] New Payment and                                                          Service Delivery Model     Hello,     We wanted to let you know that your health care provider, [facility], has volunteered to take part in our Centers for Medicare & Medicaid Services (CMS) Bundled Payments for Care Improvement Advanced Model (BPCI Advanced). This doesnt change your Medicare rights or benefits and you dont need to do anything.      What are bundled payments?   A bundled payment combines, or bundles together, payments that Medicare makes to your health care providers for the many different kinds of medical services you might get in a specific time period. In BPCI Advanced, this time period could include a hospital inpatient stay or outpatient procedure, plus 90 days.     Why would Medicare bundle payments?   Bundled payments are thought of as a value-based way to pay because health care providers are responsible for both the quality and cost of medical care they give. This is a relatively new way of paying health care providers compared to thefee-for-service way Medicare has traditionally paid, where providers are paid separately for each service they provide. Bundled payments encourage these providers to work together to provide better, more coordinated care during your hospital stay, or outpatient procedure, and through your recovery.     What does BPCI Advanced mean for me?   Youre more likely to get even better care when hospitals, doctors, and other health care providers work together. In BPCI Advanced, hospitals,  doctors, and other health care providers may be rewarded for providing better, more coordinated health care. Medicare will watch BPCI Advanced participants closely to make sure that you and other patients keep getting efficient, high quality care.     What do I need to know about BPCI Advanced?   Whats most important for you to know is that your Medicare rights and benefits dont change because your health care provider is participating in BPCI Advanced. Medicare will keep covering all of your medically necessary services.       January 2019    Beneficiary Notification Letter - BPCI Advanced (page 2)     Even though Medicare will pay your doctor in a different way under BPCI Advanced, how much you have to pay wont change. Health care providers and suppliers who are enrolled in Medicare will submit their Medicare claims like they always have.     Youll have all the same Medicare rights and protections, including the right to choose which hospital, doctor, or other health care provider you see. If you dont want to get care from a health care provider whos participating in BPCI Advanced, then youll have to choose a different health care provider whos not participating in the Model.     How can I give feedback about my health care?   Medicare might ask you to take a voluntary survey about the services and care you received from [facility] during your hospital stay or outpatient procedure and for a specific period of time afterwards. You can decide whether you want to take the voluntary survey, but if you do, itll help Medicare make BPCI Advanced and the care of other Medicare patients better.     If you have concerns or complaints about your care, you can:     Talk to your doctor or health care provider.     Contact your Beneficiary and Family Centered Care Quality Improvement Organization (CC-QIO). You can get your CC-QIOs phone number at Medicare.gov/contacts or by calling 1-800-MEDICARE. TTY users  can call 1-319.868.5361.     Where can I learn more about BPCI Advanced?   Learn more about BPCI Advanced at https://innovation.cms.gov/initiatives/bpci-advanced/:     A list of all the hospitals and physician group practices in the country participating in BPCI Advanced.     All of the inpatient and outpatient Clinical Episodes that are currently included under BPCI Advanced. A Clinical Episode is a grouping of medical conditions or diagnoses that are included in the BPCI Advanced Model.                                   January 2019

## 2021-06-27 NOTE — PROGRESS NOTES
Progress Notes by Felipe Rangel MD at 4/1/2019  4:26 PM     Author: Felipe Rangel MD Service: -- Author Type: Physician    Filed: 4/1/2019  4:29 PM Encounter Date: 4/1/2019 Status: Signed    : Felipe Rangel MD (Physician)       Type: pacemaker alert remote transmission for long AT/AF.  Presenting rhythm: AP-VS 60 bpm.  Battery/lead status: stable.  Arrhythmias: since 1/9/19; 3 AF episodes, longest lasting 11hrs on 3/31/19, v-rates >/=120bpm ~90%, AF burden 1%. 3 ventricular high rate episodes and 9 SVT episodes, these all appear to be AF with RVR.  Anticoagulant: warfarin.  Comments: normal pacemaker function. Routed to device RN for review. CARINA       Transmission reviewed, agree with above. Known PAF, low AF burden, since device implanted in October 2018. Episode of AF was recorded Saturday night into Sunday, lasting total of 11 hours with average v-rate of 130s, max rates 170s.     Reviewed with patient, he denies any awareness of recent rhythm changes. Confirms Warfarin compliance. Advised to call with any changes in symptoms and we can check a remote transmission from home. He agrees.      Will update Dr. Rangel, AF burden is low but rates while in AF on faster side. See histograms/AF burden graph below.             I discussed with patient by telephone   he has taken warfarin has his INRs adjusted through [facility]  He should remain on warfarin-previously I wondered if he should stop the warfarin because of his fall risk and I thought he was not having much atrial fibrillation but with his 11-hour episode atrial fibrillation he should remain on anticoagulation indefinitely  For now, just follow his atrial fibrillation burden

## 2021-06-28 NOTE — PROGRESS NOTES
Progress Notes by Vero Cooper RN at 1/7/2020  9:50 AM     Author: Vero Cooper RN Service: -- Author Type: Registered Nurse    Filed: 1/10/2020 12:43 PM Encounter Date: 1/7/2020 Status: Signed    : Vero Cooper RN (Registered Nurse)       In clinic device check with Device RN.  Please see link for full device report.  Patient was informed of results and next follow up during today's visit.     When Mr. Grey was taking a seat on the exam table today it was noted that the bottom third of his left pant leg was wet.  Upon further check, his left sock and shoe were soaked with urine and the valve to empty his urinary leg bag was open and dripping with clear straw-colored urine.  I closed the valve and advised that he head straight back to his assisted living residence for assistance to take a shower and new dry clothes for the lower half of his body.    Housekeeping was called to clean the Device exam room floor, chairs and exam table.    Type: Routine in-clinic IPG check.  Presenting Rhythm: Ap-Vs at 60 bpm.  Lead/Battery status: Stable lead and battery measurements.  Arrhythmias: 14 ventricular high rate detections, available EGMs mostly show rapid ventricular rates (RVR) to patient's atrial fibrillation (AF) and atrial flutter (AFL); however, on 12/01/19 at 1644 hours one episodes show V-rate at 253 bpm, probable 9 beats NSVT. Patient and wife deny symptoms. 125 Fast A & V, EGMs show atrial-driven tachy arrhythmias probable AFL w/RVR. 222 Mode Switches, EGMs confirm AF, longest being ~4 hours on 12/12/19 starting at 1141 hours. [place] 2.7%, ventricular rates >/=120 bpm 75-80%, patient and his wife deny correlating symptoms or complaints.  Anticoagulant: apixaban  Comments: Normal device function. There is a definite drop in patient's activity level beginning in August 2019 most likely causing Accelerometer RPO feature to reset Set Points to 5/15; therefore, reprogrammed to nominal  settings, 18/40. Reactive Atrial ATP programmed on per protocol. Routed to Dr. Metzger for review of ventricular rates during AF/AFL. Patient currently on metoprolol succinate 50 mg daily.

## 2021-06-29 NOTE — PROGRESS NOTES
"Progress Notes by Felipe Rangel MD at 7/15/2020  4:10 PM     Author: Felipe Rangel MD Service: -- Author Type: Physician    Filed: 7/15/2020  2:20 PM Encounter Date: 7/15/2020 Status: Signed    : Felipe Rangel MD (Physician)           The patient has been notified of following:     \"This telephone visit will be conducted via a call between you and your physician/provider. We have found that certain health care needs can be provided without the need for a physical exam.  This service lets us provide the care you need with a phone conversation.  If a prescription is necessary we can send it directly to your pharmacy.  If lab work is needed we can place an order for that and you can then stop by our lab to have the test done at a later time. If during the course of the call the physician/provider feels a telephone visit is not appropriate, you will not be charged for this service.\" Verbal consent has been obtained for this service by care team member:         HEART CARE PHONE ENCOUNTER        The patient has chosen to have the visit conducted as a telephone visit, to reduce risk of exposure given the current status of Coronavirus in our community. This telephone visit is being conducted via a call between the patient and physician/provider. Health care needs are being provided without a physical exam.     Assessment/Recommendations      1. Sepsis from urinary source (e. Coli and pseudomonas) - blood cultures are pending, negative to date  2. Atrial fibrillation - controlled ventricular response  3. Long-term anticoagulation with apixaban 5 mg two times a day  4. 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 Preserved  left ventricular function  6. Dual chamber pacemaker for sinus node dysfunction - functioning appropriately  7. Medtronic MRI " compatible dual-chamber system October 4, 2018  8. Advanced cognitive dysfunction  9. History of R occipital and parietal cerebral infarction  Plan:  1.   Pacemaker evaluation shows battery should last another 1 years  Almost no atrial fibrillation since mid June  Reviewed medicines; should continue Eliquis 5 mg twice daily as well as digoxin, metoprolol and other medications  Have pacemaker check through transtelephonic monitoring every 3 months  Appointment has been scheduled for the urologist to discuss your incontinence and need for potential catheterization  Follow-up in 1 year for comprehensive cardiac arrhythmia device assessment    Follow Up Plan: Follow up in   I have reviewed the note as documented.  This accurately captures the substance of my conversation with the patient.    Total time of call between patient and provider was 21 minutes   Start Time: 150Stop Time:211  Additional 20 minutes to review medical records       History of Present Illness/Subjective    Sumeet Grey is a 84 y.o. male who is being evaluated via a billable telephone visit.      Patient was hospitalized in early June with sepsis and high fevers and had a urinary tract infection.  There may have also been some seizures and mental decline  Atrial fibrillation with rapid ventricular response was treated by increase metoprolol 200 mg twice daily adding digoxin 0.125 mg/day  Previously 1 was on warfarin now is on Eliquis 5 mg twice daily  No lives with his wife in assisted living situation  Apparently has substantial neurologic and mental decline  Does get up and uses a walker  Main complaint is urinary frequency and incontinence  Did have a chronic Wayne catheter this was removed during last hospitalization with a urinary sepsis  He is quite upset with his incontinence  Has an appointment set up with his urologist to discuss urinary management  No syncope  No chest pain  May have some edema was advised by the nurses to elevate  his legs discuss with his wife  Did discuss with his nurse      I have reviewed and updated the patient's Past Medical History, Social History, Family History and Medication List.     Physical Examination not performed given phone encounter Review of Systems                                                Medical History  Surgical History Family History Social History   Past Medical History:   Diagnosis Date   ? Anemia 10/06/2018   ? Cancer (H)    ? Elevated troponin level; Type III event 2019   ? Sepsis (H)    ? Stroke (cerebrum) (H)    ? Syncope     Past Surgical History:   Procedure Laterality Date   ? EP PACEMAKER INSERT Left 10/4/2018    EP Pacemaker Insertion; Felipe Rangel MD;  Location: [facility]   ? LUMBAR PUNCTURE FLUORO GUIDED DIAGNOSTIC  2019   ? PICC  2020        ? 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 Stroke Father    ? Heart attack Brother    ? Stroke Brother     Social History     Socioeconomic History   ? Marital status:      Spouse name: Beronica   ? Number of children: Not on file   ? Years of education: Not on file   ? Highest education level: Not on file   Occupational History     Employer: RETIRED   Social Needs   ? Financial resource strain: Not on file   ? Food insecurity     Worry: Not on file     Inability: Not on file   ? Transportation needs     Medical: Not on file     Non-medical: Not on file   Tobacco Use   ? Smoking status: Former Smoker     Last attempt to quit: 1961     Years since quittin.5   ? Smokeless tobacco: Never Used   Substance and Sexual Activity   ? Alcohol use: Not Currently     Comment: occ   ? Drug use: No   ? Sexual activity: Not on file   Lifestyle   ? Physical activity     Days per week: 0 days     Minutes per session: Not on file   ? Stress: Not on file   Relationships   ? Social connections     Talks on phone: Not on file     Gets together: Not  on file     Attends Spiritism service: Not on file     Active member of club or organization: Not on file     Attends meetings of clubs or organizations: Not on file     Relationship status: Not on file   ? Intimate partner violence     Fear of current or ex partner: Not on file     Emotionally abused: Not on file     Physically abused: Not on file     Forced sexual activity: Not on file   Other Topics Concern   ? Not on file   Social History Narrative   ? Not on file          Medications  Allergies   Current Outpatient Medications   Medication Sig Dispense Refill   ? acetaminophen (TYLENOL) 500 MG tablet Take 1-2 tablets (500-1,000 mg total) by mouth every 4 (four) hours as needed.  0   ? apixaban (ELIQUIS) 5 mg Tab tablet Take 1 tablet (5 mg total) by mouth 2 (two) times a day. 60 tablet 0   ? calcium carbonate (OS-PADMINI) 600 mg calcium (1,500 mg) tablet Take 600 mg by mouth 2 (two) times a day. Take 2 tablets every morning and 1 tablet every night            ? cetirizine (ZYRTEC) 10 MG tablet Take 10 mg by mouth daily as needed for allergies (Not on current med list from assisted living residence).      ? digoxin (LANOXIN) 125 mcg (0.125 mg) tablet Take 1 tablet (125 mcg total) by mouth daily with supper. 30 tablet 0   ? furosemide (LASIX) 20 MG tablet Take 1 tablet (20 mg total) by mouth daily as needed. Take lasix 20 mg once a day if needed for leg swelling/edema 30 tablet 0   ? guaiFENesin (ROBITUSSIN) 100 mg/5 mL syrup Take 200 mg by mouth 4 (four) times a day as needed for cough.     ? levETIRAcetam (KEPPRA) 500 MG tablet Take 1 tablet (500 mg total) by mouth 2 (two) times a day. 60 tablet 0   ? loperamide (IMODIUM) 2 mg capsule Take 1 capsule by mouth daily as needed.     ? magnesium hydroxide (MILK OF MAG) 400 mg/5 mL Susp suspension Take 30 mL by mouth daily as needed.  0   ? metoprolol tartrate (LOPRESSOR) 100 MG tablet Take 1 tablet (100 mg total) by mouth 2 (two) times a day. 60 tablet 0   ? miconazole  (MICOTIN) 2 % cream Apply 1 application topically 2 (two) times a day.     ? polyethylene glycol (MIRALAX) 17 gram packet Take 1 packet (17 g total) by mouth daily.  0   ? simvastatin (ZOCOR) 40 MG tablet Take 40 mg by mouth at bedtime.     ? tamsulosin (FLOMAX) 0.4 mg cap Take 1 capsule (0.4 mg total) by mouth daily after supper. 30 capsule 0   ? vitamin A-vitamin C-vit E-min (OCUVITE) Tab tablet Take 1 tablet by mouth 2 (two) times a day.              No current facility-administered medications for this visit.     No Known Allergies      Lab Results    Chemistry/lipid CBC Cardiac Enzymes/BNP/TSH/INR   Lab Results   Component Value Date    CHOL 150 10/03/2018    HDL 44 10/03/2018    LDLCALC 85 10/03/2018    TRIG 103 10/03/2018    CREATININE 0.63 (L) 06/09/2020    BUN 13 06/09/2020    K 3.5 06/09/2020     06/09/2020     (H) 06/09/2020    CO2 24 06/09/2020    Lab Results   Component Value Date    WBC 11.6 (H) 06/09/2020    HGB 11.7 (L) 06/09/2020    HCT 35.2 (L) 06/09/2020    MCV 89 06/09/2020     (H) 06/09/2020    Lab Results   Component Value Date    CKTOTAL 24 (L) 07/20/2019    TROPONINI <0.01 06/05/2020    BNP 58 06/28/2019    TSH 3.64 10/10/2018    INR 1.48 (H) 06/05/2020        Felipe Rangel

## 2021-06-29 NOTE — PROGRESS NOTES
Progress Notes by Nazia Fung RN at 6/26/2020 10:00 AM     Author: Nazia Fung RN Service: -- Author Type: Registered Nurse    Filed: 6/26/2020  2:12 PM Encounter Date: 6/26/2020 Status: Signed    : Nazia Fung RN (Registered Nurse)       Clinic Care Coordination Contact    Clinic Care Coordination Contact  OUTREACH    Referral Information:  Referral Source: IP Handoff([facility])         Chief Complaint   Patient presents with   ? Clinic Care Coordination - Initial        Clinic Utilization  Difficulty keeping appointments:: No  Compliance Concerns: No  No-Show Concerns: No  No PCP office visit in Past Year: Yes  Utilization    Last refreshed: 6/26/2020 10:31 AM:  Hospital Admissions 3           Last refreshed: 6/26/2020 10:31 AM:  ED Visits 4           Last refreshed: 6/26/2020 10:31 AM:  No Show Count (past year) 0              Current as of: 6/26/2020 10:31 AM              Clinical Concerns:  Current Medical Concerns:  NPH,  A-fib, bradycardia, weakness, uti, sepsis, seizures, uti, sinus node dysfunction, pacemaker,   Current Behavioral Concerns: none    Education Provided to patient: yes      Health Maintenance Reviewed: Up to date  Clinical Pathway: None     Medication Management:  Assisted Living Facility manages medications     Functional Status:  Dependent ADLs:: Ambulation-walker  Dependent IADLs:: Cleaning, Cooking, Laundry, Shopping, Meal Preparation, Medication Management, Money Management, Transportation, Incontinence  Mobility Status: Independent w/Device    Living Situation:  Current living arrangement:: I live in assisted living, I live in a private home with spouse  Type of residence:: Assisted living    Lifestyle & Psychosocial Needs:  Lifestyle   ? Physical activity     Days per week: 0 days     Minutes per session: Not on file   ? Stress: Not on file           Transportation means:: Family     Nondenominational or spiritual beliefs that impact treatment:: No  Mental health DX::  "No  Mental health management concern (GOAL):: No  Informal Support system:: Spouse, Family   Socioeconomic History   ? Marital status:      Spouse name: Beronica   ? Number of children: Not on file   ? Years of education: Not on file   ? Highest education level: Not on file   Occupational History     Employer: RETIRED     Tobacco Use   ? Smoking status: Former Smoker     Last attempt to quit: 1961     Years since quittin.5   ? Smokeless tobacco: Never Used   Substance and Sexual Activity   ? Alcohol use: Not Currently     Comment: occ   ? Drug use: No              Resources and Interventions:  Current Resources:      Community Resources: None  Supplies used at home:: Hearing Aid Batteries, Compression Stockings  Equipment Currently Used at Home: walker, rolling, shower chair, grab bar, tub/shower, grab bar, toilet         Referrals Placed: None     84 yr M PMH:  NPH,  A-fib, bradycardia, weakness, uti, sepsis, seizures, uti, sinus node dysfunction, pacemaker.  Patient admitted - with NPH, sepsis, A-fi and avr.  Currently lives with spouse in assisted living facility.  All meals are brought to the room by the staff.  Medications are managed by the staff.  Patient no longer has a urinary catheter.  States it was discontinued while in the hospital.  Denies difficulty emptying his bladder since coming home.  States he wears Depends.  Ambulates with a walker.  Instructed on falls precautions of not rushing to get to the bathroom due to incontinence, using a urinal at night.  Patient stated an understanding.  Patient stated he is feeling \"the same\" as before going into the hospital.  Patient declined to make additional goals aside from remaining out of the hospital.  Patient enrolled for Goals as listed below.       Goals:   Goals        General    Other (pt-stated)     Notes - Note edited  2020  1:44 PM by Nazia Fung RN    Goal Statement:  I want to stay out of the hospital for the next 90 " days.  Date Goal set:  6/26/2020  Barriers:   Recent hospitalization  Strengths:  Able to identify and advocate for needs, good support system from family  Date to Achieve By:  9/7/20 (based on hospital discharge date of 6/9/20)  Patient expressed understanding of goal:  Yes  Action steps to achieve this goal:  1. I will attend all my appts as scheduled, and recommended follow up appts  2. I will call my clinic if I start to feel sick or notice any change(s) in my health, and schedule an appt if needed  3. I will call the triage nurse line for advice, before going to the hospital  4. I will go to  or Walk-In-Clinic before going to the hospital, if I am unable to get an appt with my PCP  5. I will update the Community Healthcare Worker during outreach calls and ask for additional support or resources, if needed              Patient/Caregiver understanding: Patient stated an understanding       Future Appointments              In 2 weeks [facility] REMOTE DEVICE CHECK FROM HOME [facility] , [facility]    In 2 weeks Felipe Rangel MD [facility] , [facility]          Plan: DELEGATION: NONE

## 2021-07-14 PROBLEM — I63.9 STROKE (H): Status: RESOLVED | Noted: 2018-10-02 | Resolved: 2018-10-03

## 2022-01-18 VITALS
BODY MASS INDEX: 23.05 KG/M2 | RESPIRATION RATE: 14 BRPM | DIASTOLIC BLOOD PRESSURE: 80 MMHG | WEIGHT: 161 LBS | HEART RATE: 80 BPM | SYSTOLIC BLOOD PRESSURE: 124 MMHG | HEIGHT: 70 IN